# Patient Record
Sex: FEMALE | Employment: FULL TIME | URBAN - METROPOLITAN AREA
[De-identification: names, ages, dates, MRNs, and addresses within clinical notes are randomized per-mention and may not be internally consistent; named-entity substitution may affect disease eponyms.]

---

## 2023-03-01 PROBLEM — F41.9 ANXIETY: Status: ACTIVE | Noted: 2023-03-01

## 2023-03-01 PROBLEM — G43.909 MIGRAINES: Status: ACTIVE | Noted: 2023-03-01

## 2023-03-01 PROBLEM — I10 BENIGN ESSENTIAL HYPERTENSION: Status: ACTIVE | Noted: 2023-03-01

## 2023-03-01 RX ORDER — ESCITALOPRAM OXALATE 20 MG/1
20 TABLET ORAL DAILY
COMMUNITY
End: 2023-03-13 | Stop reason: SDUPTHER

## 2023-03-01 RX ORDER — NAPROXEN 500 MG/1
500 TABLET ORAL EVERY 12 HOURS PRN
COMMUNITY
Start: 2022-07-14 | End: 2023-10-03

## 2023-03-01 RX ORDER — CALCIUM CARBONATE/VITAMIN D3 500-10/5ML
LIQUID (ML) ORAL
COMMUNITY

## 2023-03-01 RX ORDER — ESCITALOPRAM OXALATE 10 MG/1
10 TABLET ORAL DAILY
COMMUNITY
End: 2023-10-03 | Stop reason: SDUPTHER

## 2023-03-01 RX ORDER — RIZATRIPTAN BENZOATE 10 MG/1
10 TABLET ORAL
COMMUNITY
Start: 2022-10-07 | End: 2023-10-03 | Stop reason: SDUPTHER

## 2023-03-01 RX ORDER — NORETHINDRONE AND ETHINYL ESTRADIOL AND FERROUS FUMARATE 0.8-25(24)
1 KIT ORAL DAILY
COMMUNITY
End: 2023-10-03 | Stop reason: SDUPTHER

## 2023-03-01 RX ORDER — LISINOPRIL 10 MG/1
10 TABLET ORAL DAILY
COMMUNITY
End: 2023-03-13 | Stop reason: SDUPTHER

## 2023-03-01 RX ORDER — DROSPIRENONE AND ETHINYL ESTRADIOL 0.03MG-3MG
KIT ORAL
COMMUNITY
End: 2023-10-03

## 2023-03-01 RX ORDER — MULTIVITAMIN
TABLET ORAL
COMMUNITY

## 2023-03-01 RX ORDER — DIPHENHYDRAMINE HCL 25 MG
TABLET ORAL
COMMUNITY
End: 2023-10-03 | Stop reason: ALTCHOICE

## 2023-03-01 RX ORDER — HYDROXYZINE HYDROCHLORIDE 10 MG/1
10 TABLET, FILM COATED ORAL 3 TIMES DAILY PRN
COMMUNITY
Start: 2022-07-14 | End: 2023-10-03

## 2023-03-13 ENCOUNTER — OFFICE VISIT (OUTPATIENT)
Dept: PRIMARY CARE | Facility: CLINIC | Age: 33
End: 2023-03-13
Payer: COMMERCIAL

## 2023-03-13 VITALS
TEMPERATURE: 97.3 F | HEIGHT: 63 IN | BODY MASS INDEX: 20.73 KG/M2 | HEART RATE: 87 BPM | WEIGHT: 117 LBS | OXYGEN SATURATION: 98 % | DIASTOLIC BLOOD PRESSURE: 79 MMHG | SYSTOLIC BLOOD PRESSURE: 114 MMHG

## 2023-03-13 DIAGNOSIS — G43.709 CHRONIC MIGRAINE WITHOUT AURA WITHOUT STATUS MIGRAINOSUS, NOT INTRACTABLE: ICD-10-CM

## 2023-03-13 DIAGNOSIS — J01.00 ACUTE NON-RECURRENT MAXILLARY SINUSITIS: ICD-10-CM

## 2023-03-13 DIAGNOSIS — F41.9 ANXIETY: Primary | ICD-10-CM

## 2023-03-13 DIAGNOSIS — I10 BENIGN ESSENTIAL HYPERTENSION: ICD-10-CM

## 2023-03-13 PROCEDURE — 99214 OFFICE O/P EST MOD 30 MIN: CPT | Performed by: STUDENT IN AN ORGANIZED HEALTH CARE EDUCATION/TRAINING PROGRAM

## 2023-03-13 PROCEDURE — 3074F SYST BP LT 130 MM HG: CPT | Performed by: STUDENT IN AN ORGANIZED HEALTH CARE EDUCATION/TRAINING PROGRAM

## 2023-03-13 PROCEDURE — 3078F DIAST BP <80 MM HG: CPT | Performed by: STUDENT IN AN ORGANIZED HEALTH CARE EDUCATION/TRAINING PROGRAM

## 2023-03-13 PROCEDURE — 1036F TOBACCO NON-USER: CPT | Performed by: STUDENT IN AN ORGANIZED HEALTH CARE EDUCATION/TRAINING PROGRAM

## 2023-03-13 RX ORDER — AMOXICILLIN AND CLAVULANATE POTASSIUM 875; 125 MG/1; MG/1
875 TABLET, FILM COATED ORAL 2 TIMES DAILY
Qty: 20 TABLET | Refills: 0 | Status: SHIPPED | OUTPATIENT
Start: 2023-03-13 | End: 2023-03-23

## 2023-03-13 RX ORDER — ESCITALOPRAM OXALATE 20 MG/1
20 TABLET ORAL DAILY
Qty: 30 TABLET | Refills: 3 | Status: SHIPPED | OUTPATIENT
Start: 2023-03-13 | End: 2023-07-25 | Stop reason: SDUPTHER

## 2023-03-13 RX ORDER — LISINOPRIL 10 MG/1
10 TABLET ORAL DAILY
Qty: 30 TABLET | Refills: 3 | Status: SHIPPED | OUTPATIENT
Start: 2023-03-13 | End: 2023-07-25 | Stop reason: SDUPTHER

## 2023-03-13 SDOH — ECONOMIC STABILITY: FOOD INSECURITY: WITHIN THE PAST 12 MONTHS, YOU WORRIED THAT YOUR FOOD WOULD RUN OUT BEFORE YOU GOT MONEY TO BUY MORE.: NEVER TRUE

## 2023-03-13 SDOH — ECONOMIC STABILITY: FOOD INSECURITY: WITHIN THE PAST 12 MONTHS, THE FOOD YOU BOUGHT JUST DIDN'T LAST AND YOU DIDN'T HAVE MONEY TO GET MORE.: NEVER TRUE

## 2023-03-13 ASSESSMENT — ENCOUNTER SYMPTOMS
DEPRESSION: 0
LOSS OF SENSATION IN FEET: 0
OCCASIONAL FEELINGS OF UNSTEADINESS: 0

## 2023-03-13 ASSESSMENT — LIFESTYLE VARIABLES
SKIP TO QUESTIONS 9-10: 0
AUDIT-C TOTAL SCORE: 2
HOW OFTEN DO YOU HAVE A DRINK CONTAINING ALCOHOL: MONTHLY OR LESS
HOW OFTEN DO YOU HAVE SIX OR MORE DRINKS ON ONE OCCASION: LESS THAN MONTHLY
HOW MANY STANDARD DRINKS CONTAINING ALCOHOL DO YOU HAVE ON A TYPICAL DAY: 1 OR 2

## 2023-03-13 ASSESSMENT — PATIENT HEALTH QUESTIONNAIRE - PHQ9
SUM OF ALL RESPONSES TO PHQ9 QUESTIONS 1 & 2: 0
1. LITTLE INTEREST OR PLEASURE IN DOING THINGS: NOT AT ALL
2. FEELING DOWN, DEPRESSED OR HOPELESS: NOT AT ALL

## 2023-03-13 NOTE — PROGRESS NOTES
"Subjective   Patient ID: Rika Vasquez is a 33 y.o. female who presents for Follow-up.    Pt presenting to follow up.  Her mood has been better and she is tolerating well the lexapro.  Report some sinusitis with nasal drainage. 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         Review of Systems   All other systems reviewed and are negative.      Objective   /79 (BP Location: Left arm, Patient Position: Sitting, BP Cuff Size: Large adult)   Pulse 87   Temp 36.3 °C (97.3 °F)   Ht 1.6 m (5' 3\")   Wt 53.1 kg (117 lb)   SpO2 98%   BMI 20.73 kg/m²     Physical Exam  Vitals and nursing note reviewed.   Constitutional:       General: She is not in acute distress.     Appearance: She is not ill-appearing.   HENT:      Head: Normocephalic and atraumatic.   Cardiovascular:      Rate and Rhythm: Normal rate and regular rhythm.      Pulses: Normal pulses.      Heart sounds: Normal heart sounds. No murmur heard.     No friction rub. No gallop.   Pulmonary:      Effort: Pulmonary effort is normal. No respiratory distress.      Breath sounds: Normal breath sounds. No stridor. No wheezing, rhonchi or rales.   Neurological:      Mental Status: She is alert and oriented to person, place, and time.   Psychiatric:         Mood and Affect: Mood normal.         Assessment/Plan   Problem List Items Addressed This Visit          Circulatory    Benign essential hypertension     Chronic and stable  Continue with lisinopril         Relevant Medications    lisinopril 10 mg tablet    Other Relevant Orders    Follow Up In Primary Care       Other    Anxiety - Primary     Chronic and stable  Continue with lexapro         Relevant Medications    escitalopram (Lexapro) 20 mg tablet    Other Relevant Orders    Follow Up In Primary Care    Migraines     Chronic and stable  Continue with rizatriptan         Relevant Orders    Follow Up In Primary Care     Other Visit Diagnoses       Acute non-recurrent maxillary sinusitis        " Order augmentin  Recommend flonase OTC    Relevant Medications    amoxicillin-pot clavulanate (Augmentin) 875-125 mg tablet    Other Relevant Orders    Follow Up In Primary Care

## 2023-07-24 ENCOUNTER — TELEPHONE (OUTPATIENT)
Dept: PRIMARY CARE | Facility: CLINIC | Age: 33
End: 2023-07-24
Payer: COMMERCIAL

## 2023-07-24 DIAGNOSIS — I10 BENIGN ESSENTIAL HYPERTENSION: ICD-10-CM

## 2023-07-24 DIAGNOSIS — F41.9 ANXIETY: ICD-10-CM

## 2023-07-24 NOTE — TELEPHONE ENCOUNTER
Former [facility] pt    Pt called in and is requesting a refill on Lisinopril 10 mg  and Lexapro 20 mg.     Pt's pharmacy is [facility] [address] [place]

## 2023-07-25 RX ORDER — ESCITALOPRAM OXALATE 20 MG/1
20 TABLET ORAL DAILY
Qty: 90 TABLET | Refills: 0 | Status: SHIPPED | OUTPATIENT
Start: 2023-07-25 | End: 2023-10-03 | Stop reason: SDUPTHER

## 2023-07-25 RX ORDER — ELETRIPTAN HYDROBROMIDE 20 MG/1
20 TABLET, FILM COATED ORAL ONCE AS NEEDED
COMMUNITY
Start: 2023-01-18 | End: 2023-10-03

## 2023-07-25 RX ORDER — LISINOPRIL 10 MG/1
10 TABLET ORAL DAILY
Qty: 90 TABLET | Refills: 0 | Status: SHIPPED | OUTPATIENT
Start: 2023-07-25 | End: 2023-10-03 | Stop reason: SDUPTHER

## 2023-07-25 NOTE — TELEPHONE ENCOUNTER
PATIENT CALLED IN FOR REFILL ON LISINOPRIL, LEXAPRO 20MG    PHARMACY: [facility], [place]    FIRST TIME SEEING MELI IS OCTOBER 3, 2023

## 2023-10-03 ENCOUNTER — OFFICE VISIT (OUTPATIENT)
Dept: PRIMARY CARE | Facility: CLINIC | Age: 33
End: 2023-10-03
Payer: COMMERCIAL

## 2023-10-03 VITALS
RESPIRATION RATE: 16 BRPM | WEIGHT: 128 LBS | BODY MASS INDEX: 22.67 KG/M2 | SYSTOLIC BLOOD PRESSURE: 120 MMHG | TEMPERATURE: 96.8 F | OXYGEN SATURATION: 99 % | DIASTOLIC BLOOD PRESSURE: 83 MMHG | HEART RATE: 78 BPM

## 2023-10-03 DIAGNOSIS — F41.9 ANXIETY: ICD-10-CM

## 2023-10-03 DIAGNOSIS — Z13.29 SCREENING FOR THYROID DISORDER: ICD-10-CM

## 2023-10-03 DIAGNOSIS — G44.209 MUSCLE TENSION HEADACHE: ICD-10-CM

## 2023-10-03 DIAGNOSIS — G43.709 CHRONIC MIGRAINE WITHOUT AURA WITHOUT STATUS MIGRAINOSUS, NOT INTRACTABLE: ICD-10-CM

## 2023-10-03 DIAGNOSIS — F51.01 PRIMARY INSOMNIA: ICD-10-CM

## 2023-10-03 DIAGNOSIS — Z30.09 FAMILY PLANNING COUNSELING: ICD-10-CM

## 2023-10-03 DIAGNOSIS — I10 BENIGN ESSENTIAL HYPERTENSION: Primary | ICD-10-CM

## 2023-10-03 PROCEDURE — 3074F SYST BP LT 130 MM HG: CPT

## 2023-10-03 PROCEDURE — 3079F DIAST BP 80-89 MM HG: CPT

## 2023-10-03 PROCEDURE — 1036F TOBACCO NON-USER: CPT

## 2023-10-03 PROCEDURE — 99214 OFFICE O/P EST MOD 30 MIN: CPT

## 2023-10-03 RX ORDER — LISINOPRIL 10 MG/1
10 TABLET ORAL DAILY
Qty: 90 TABLET | Refills: 3 | Status: SHIPPED | OUTPATIENT
Start: 2023-10-03 | End: 2024-10-02

## 2023-10-03 RX ORDER — TRAZODONE HYDROCHLORIDE 50 MG/1
50 TABLET ORAL NIGHTLY PRN
Qty: 30 TABLET | Refills: 5 | Status: SHIPPED | OUTPATIENT
Start: 2023-10-03 | End: 2024-03-27

## 2023-10-03 RX ORDER — ESCITALOPRAM OXALATE 20 MG/1
20 TABLET ORAL DAILY
Qty: 90 TABLET | Refills: 3 | Status: SHIPPED | OUTPATIENT
Start: 2023-10-03 | End: 2024-10-02

## 2023-10-03 RX ORDER — NORETHINDRONE AND ETHINYL ESTRADIOL AND FERROUS FUMARATE 0.8-25(24)
1 KIT ORAL DAILY
Qty: 28 TABLET | Refills: 3 | Status: SHIPPED | OUTPATIENT
Start: 2023-10-03 | End: 2024-01-15 | Stop reason: SDUPTHER

## 2023-10-03 RX ORDER — RIZATRIPTAN BENZOATE 10 MG/1
10 TABLET ORAL ONCE AS NEEDED
Qty: 9 TABLET | Refills: 3 | Status: SHIPPED | OUTPATIENT
Start: 2023-10-03 | End: 2024-01-15 | Stop reason: SDUPTHER

## 2023-10-03 RX ORDER — ESCITALOPRAM OXALATE 10 MG/1
10 TABLET ORAL DAILY
Qty: 90 TABLET | Refills: 3 | Status: SHIPPED | OUTPATIENT
Start: 2023-10-03 | End: 2024-01-15 | Stop reason: ALTCHOICE

## 2023-10-03 RX ORDER — TIZANIDINE 2 MG/1
2 TABLET ORAL EVERY 8 HOURS PRN
Qty: 30 TABLET | Refills: 3 | Status: SHIPPED | OUTPATIENT
Start: 2023-10-03 | End: 2023-11-30

## 2023-10-03 SDOH — ECONOMIC STABILITY: FOOD INSECURITY: WITHIN THE PAST 12 MONTHS, THE FOOD YOU BOUGHT JUST DIDN'T LAST AND YOU DIDN'T HAVE MONEY TO GET MORE.: NEVER TRUE

## 2023-10-03 SDOH — ECONOMIC STABILITY: FOOD INSECURITY: WITHIN THE PAST 12 MONTHS, YOU WORRIED THAT YOUR FOOD WOULD RUN OUT BEFORE YOU GOT MONEY TO BUY MORE.: NEVER TRUE

## 2023-10-03 ASSESSMENT — ENCOUNTER SYMPTOMS
MUSCULOSKELETAL NEGATIVE: 1
CONSTITUTIONAL NEGATIVE: 1
CARDIOVASCULAR NEGATIVE: 1
RESPIRATORY NEGATIVE: 1
GASTROINTESTINAL NEGATIVE: 1
PSYCHIATRIC NEGATIVE: 1
NEUROLOGICAL NEGATIVE: 1
ENDOCRINE NEGATIVE: 1
EYES NEGATIVE: 1
HEMATOLOGIC/LYMPHATIC NEGATIVE: 1

## 2023-10-03 ASSESSMENT — LIFESTYLE VARIABLES
HOW OFTEN DO YOU HAVE SIX OR MORE DRINKS ON ONE OCCASION: NEVER
SKIP TO QUESTIONS 9-10: 1
HOW OFTEN DO YOU HAVE A DRINK CONTAINING ALCOHOL: MONTHLY OR LESS
AUDIT-C TOTAL SCORE: 1
HOW MANY STANDARD DRINKS CONTAINING ALCOHOL DO YOU HAVE ON A TYPICAL DAY: 1 OR 2

## 2023-10-03 ASSESSMENT — PAIN SCALES - GENERAL: PAINLEVEL: 0-NO PAIN

## 2023-10-03 NOTE — PROGRESS NOTES
Subjective   Patient ID: Rika Vasquez is a 33 y.o. female who presents for hypertension and anxiety follow up.    Diet: Cooking at home, eating healthy. Good amounts of lean proteins, fruits, veggies. Avoiding sugary beverages; sweets periodically  Exercise: Yoga daily for about 20-60 minutes and walking 30-60 minutes, exercise most days of the week  Weight: Stable  Water: Drinking about  oz per day  Sleep: Having issues stable through the night, feeling groggy in the morning. Getting about 3-4 hours at a time, up in the middle of the night for about an hour. Occurring most night.   Social: Single, lives alone in a single floor home with raised level, no issues with stairs. No children, 2 cats. Mother identified as support source  Professional: Full time     Review of Systems   Constitutional: Negative.    HENT: Negative.     Eyes: Negative.    Respiratory: Negative.     Cardiovascular: Negative.    Gastrointestinal: Negative.    Endocrine: Negative.    Genitourinary: Negative.    Musculoskeletal: Negative.    Skin: Negative.    Neurological: Negative.    Hematological: Negative.    Psychiatric/Behavioral: Negative.          Current Outpatient Medications   Medication Sig Dispense Refill    ACETAMINOPHEN-CAFFEINE ORAL -      magnesium oxide 400 mg magnesium capsule -      multivitamin tablet -      escitalopram (Lexapro) 10 mg tablet Take 1 tablet (10 mg) by mouth once daily. 90 tablet 3    escitalopram (Lexapro) 20 mg tablet Take 1 tablet (20 mg) by mouth once daily. 90 tablet 3    lisinopril 10 mg tablet Take 1 tablet (10 mg) by mouth once daily. 90 tablet 3    noreth-ethinyl estradioL-iron 0.8mg-25mcg(24) and 75 mg (4) tablet,chewable Chew 1 tablet once daily. 28 tablet 3    rizatriptan (Maxalt) 10 mg tablet Take 1 tablet (10 mg) by mouth 1 time if needed for migraine. at onset of headache.  May repeat every 2 hours as needed.  Maximum 3 tablets in 24 hours. 9 tablet 3    tiZANidine  (Zanaflex) 2 mg tablet Take 1 tablet (2 mg) by mouth every 8 hours if needed for muscle spasms. 30 tablet 3    traZODone (Desyrel) 50 mg tablet Take 1 tablet (50 mg) by mouth as needed at bedtime for sleep. 30 tablet 5     No current facility-administered medications for this visit.     Past Surgical History:   Procedure Laterality Date    OTHER SURGICAL HISTORY  07/14/2022    Oral surgery    WISDOM TOOTH EXTRACTION  2013     Family History   Problem Relation Name Age of Onset    Hypertension Mother Jessenia kessler     Migraines Mother Jessenia kessler     Diabetes Mother Jessenia kessler     Other (Cardiac disorder) Father Ed victoria     Hypertension Father Ed victoria     Migraines Father Ed victoria     Heart attack Father Ed victoria     Arthritis Father Ed victoria     Blood clot Father Ed victoria     Hearing loss Father Ed victoria     Heart disease Father Ed victoria     Other (Cardiac disorder) Father's Brother Richard kessler     Hypertension Father's Brother Richard kessler     Migraines Father's Brother Richard kessler     Heart attack Father's Brother Richard kessler     Other (Cardiac disorder) Other      Hypertension Other      Migraines Other      Heart attack Other        Social History     Tobacco Use    Smoking status: Never    Smokeless tobacco: Never   Vaping Use    Vaping Use: Never used   Substance Use Topics    Alcohol use: Yes     Alcohol/week: 1.0 standard drink of alcohol     Types: 1 Cans of beer per week     Comment: This is only monthy if that.    Drug use: Never        Objective     Visit Vitals  /83 (BP Location: Left arm, Patient Position: Sitting, BP Cuff Size: Adult)   Pulse 78   Temp 36 °C (96.8 °F) (Temporal)   Resp 16   Wt 58.1 kg (128 lb)   SpO2 99%   BMI 22.67 kg/m²   Smoking Status Never   BSA 1.61 m²        Physical Exam  Constitutional:       Appearance: Normal appearance.   HENT:      Head: Normocephalic and atraumatic.   Eyes:      Extraocular Movements: Extraocular movements intact.      Pupils: Pupils are equal, round, and  reactive to light.   Cardiovascular:      Rate and Rhythm: Normal rate and regular rhythm.   Pulmonary:      Effort: Pulmonary effort is normal.      Breath sounds: Normal breath sounds.   Abdominal:      General: Abdomen is flat. Bowel sounds are normal.      Palpations: Abdomen is soft.   Musculoskeletal:         General: Normal range of motion.   Neurological:      General: No focal deficit present.      Mental Status: She is alert and oriented to person, place, and time.   Psychiatric:         Mood and Affect: Mood normal.         Behavior: Behavior normal.           Assessment/Plan   Problem List Items Addressed This Visit       Anxiety     Increases escitalopram to 30 mg during school year due to higher stress and anxiety. Feels this dose adequately controls her anxiety.    Escitalopram increased to 30 mg daily          Relevant Medications    escitalopram (Lexapro) 10 mg tablet    escitalopram (Lexapro) 20 mg tablet    Other Relevant Orders    CBC    Comprehensive Metabolic Panel    Follow Up In Primary Care - Established    Migraines     Nurtec worked best, but not covered by insurance  Headaches mainly tension    Start tizanidine 2-4mg as needed at night  Can consider starting topirimate for prevention  Recommend massage therapy         Relevant Medications    rizatriptan (Maxalt) 10 mg tablet    Other Relevant Orders    Follow Up In Primary Care - Established    Benign essential hypertension - Primary     Controlled in office today at 120/83  Continue checking blood pressures at home    Continue lisinopril 10mg daily         Relevant Medications    lisinopril 10 mg tablet    Other Relevant Orders    Lipid Panel    Follow Up In Primary Care - Established     Other Visit Diagnoses       Screening for thyroid disorder        Relevant Orders    Vitamin D 25-Hydroxy,Total (for eval of Vitamin D levels)    TSH with reflex to Free T4 if abnormal    Follow Up In Primary Care - Established    Family planning  counseling        Relevant Medications    noreth-ethinyl estradioL-iron 0.8mg-25mcg(24) and 75 mg (4) tablet,chewable    Other Relevant Orders    Follow Up In Primary Care - Established    Primary insomnia        Relevant Medications    traZODone (Desyrel) 50 mg tablet    Other Relevant Orders    Follow Up In Primary Care - Established    Muscle tension headache        Relevant Medications    tiZANidine (Zanaflex) 2 mg tablet    Other Relevant Orders    Referral to Massage Therapy    Follow Up In Primary Care - Established            All pertinent lab work and results were reviewed with patient.     Follow up with me in 3-4 months or sooner as needed    Homa Murrell, APRN-CNP

## 2023-10-03 NOTE — ASSESSMENT & PLAN NOTE
Nurtec worked best, but not covered by insurance  Headaches mainly tension    Start tizanidine 2-4mg as needed at night  Can consider starting topirimate for prevention  Recommend massage therapy

## 2023-10-03 NOTE — PATIENT INSTRUCTIONS
Thank you for coming to see me today.  If you have any questions or concerns following our visit, please contact the office.  Phone: [phone]    Follow up with me in 3-4 months for headache follow up    1)  Get fasting labwork a few days prior to next visit.  The lab is down the [place] from our office.     2) START tizanidine 2-4mg as needed for muscle tension/tightness    3) START trazodone 50-100mg nightly for sleep- do not start until you know how tizanidine affects you    4) I am referring you to massage therapy- can check with chiropractor to get name of recommended provider or call insurance company

## 2023-10-09 NOTE — ASSESSMENT & PLAN NOTE
Controlled in office today at 120/83  Continue checking blood pressures at home    Continue lisinopril 10mg daily

## 2023-11-29 DIAGNOSIS — G44.209 MUSCLE TENSION HEADACHE: ICD-10-CM

## 2023-11-30 RX ORDER — TIZANIDINE 2 MG/1
TABLET ORAL
Qty: 30 TABLET | Refills: 3 | Status: SHIPPED | OUTPATIENT
Start: 2023-11-30 | End: 2024-01-15

## 2024-01-11 ENCOUNTER — LAB (OUTPATIENT)
Dept: LAB | Facility: LAB | Age: 34
End: 2024-01-11
Payer: COMMERCIAL

## 2024-01-11 DIAGNOSIS — I10 BENIGN ESSENTIAL HYPERTENSION: ICD-10-CM

## 2024-01-11 DIAGNOSIS — F41.9 ANXIETY: ICD-10-CM

## 2024-01-11 DIAGNOSIS — Z13.29 SCREENING FOR THYROID DISORDER: ICD-10-CM

## 2024-01-11 LAB
25(OH)D3 SERPL-MCNC: 36 NG/ML (ref 30–100)
ALBUMIN SERPL BCP-MCNC: 4.7 G/DL (ref 3.4–5)
ALP SERPL-CCNC: 53 U/L (ref 33–110)
ALT SERPL W P-5'-P-CCNC: 14 U/L (ref 7–45)
ANION GAP SERPL CALC-SCNC: 13 MMOL/L (ref 10–20)
AST SERPL W P-5'-P-CCNC: 18 U/L (ref 9–39)
BILIRUB SERPL-MCNC: 0.4 MG/DL (ref 0–1.2)
BUN SERPL-MCNC: 10 MG/DL (ref 6–23)
CALCIUM SERPL-MCNC: 9.6 MG/DL (ref 8.6–10.3)
CHLORIDE SERPL-SCNC: 104 MMOL/L (ref 98–107)
CHOLEST SERPL-MCNC: 201 MG/DL (ref 0–199)
CHOLESTEROL/HDL RATIO: 3.7
CO2 SERPL-SCNC: 23 MMOL/L (ref 21–32)
CREAT SERPL-MCNC: 0.79 MG/DL (ref 0.5–1.05)
EGFRCR SERPLBLD CKD-EPI 2021: >90 ML/MIN/1.73M*2
ERYTHROCYTE [DISTWIDTH] IN BLOOD BY AUTOMATED COUNT: 11.9 % (ref 11.5–14.5)
GLUCOSE SERPL-MCNC: 92 MG/DL (ref 74–99)
HCT VFR BLD AUTO: 43.9 % (ref 36–46)
HDLC SERPL-MCNC: 54.8 MG/DL
HGB BLD-MCNC: 14.8 G/DL (ref 12–16)
LDLC SERPL CALC-MCNC: 129 MG/DL
MCH RBC QN AUTO: 30.7 PG (ref 26–34)
MCHC RBC AUTO-ENTMCNC: 33.7 G/DL (ref 32–36)
MCV RBC AUTO: 91 FL (ref 80–100)
NON HDL CHOLESTEROL: 146 MG/DL (ref 0–149)
NRBC BLD-RTO: 0 /100 WBCS (ref 0–0)
PLATELET # BLD AUTO: 311 X10*3/UL (ref 150–450)
POTASSIUM SERPL-SCNC: 3.9 MMOL/L (ref 3.5–5.3)
PROT SERPL-MCNC: 7.4 G/DL (ref 6.4–8.2)
RBC # BLD AUTO: 4.82 X10*6/UL (ref 4–5.2)
SODIUM SERPL-SCNC: 136 MMOL/L (ref 136–145)
TRIGL SERPL-MCNC: 88 MG/DL (ref 0–149)
TSH SERPL-ACNC: 2.96 MIU/L (ref 0.44–3.98)
VLDL: 18 MG/DL (ref 0–40)
WBC # BLD AUTO: 5.7 X10*3/UL (ref 4.4–11.3)

## 2024-01-11 PROCEDURE — 80061 LIPID PANEL: CPT

## 2024-01-11 PROCEDURE — 36415 COLL VENOUS BLD VENIPUNCTURE: CPT

## 2024-01-11 PROCEDURE — 85027 COMPLETE CBC AUTOMATED: CPT

## 2024-01-11 PROCEDURE — 84443 ASSAY THYROID STIM HORMONE: CPT

## 2024-01-11 PROCEDURE — 82306 VITAMIN D 25 HYDROXY: CPT

## 2024-01-11 PROCEDURE — 80053 COMPREHEN METABOLIC PANEL: CPT

## 2024-01-15 ENCOUNTER — OFFICE VISIT (OUTPATIENT)
Dept: PRIMARY CARE | Facility: CLINIC | Age: 34
End: 2024-01-15
Payer: COMMERCIAL

## 2024-01-15 VITALS
BODY MASS INDEX: 22.06 KG/M2 | RESPIRATION RATE: 20 BRPM | HEART RATE: 87 BPM | OXYGEN SATURATION: 98 % | WEIGHT: 124.5 LBS | TEMPERATURE: 98.3 F | HEIGHT: 63 IN | DIASTOLIC BLOOD PRESSURE: 85 MMHG | SYSTOLIC BLOOD PRESSURE: 124 MMHG

## 2024-01-15 DIAGNOSIS — F41.9 ANXIETY: ICD-10-CM

## 2024-01-15 DIAGNOSIS — Z30.09 FAMILY PLANNING COUNSELING: ICD-10-CM

## 2024-01-15 DIAGNOSIS — F51.01 PRIMARY INSOMNIA: ICD-10-CM

## 2024-01-15 DIAGNOSIS — Z13.29 SCREENING FOR THYROID DISORDER: ICD-10-CM

## 2024-01-15 DIAGNOSIS — G44.209 MUSCLE TENSION HEADACHE: ICD-10-CM

## 2024-01-15 DIAGNOSIS — G43.709 CHRONIC MIGRAINE WITHOUT AURA WITHOUT STATUS MIGRAINOSUS, NOT INTRACTABLE: ICD-10-CM

## 2024-01-15 DIAGNOSIS — I10 BENIGN ESSENTIAL HYPERTENSION: ICD-10-CM

## 2024-01-15 PROCEDURE — 1036F TOBACCO NON-USER: CPT

## 2024-01-15 PROCEDURE — 3074F SYST BP LT 130 MM HG: CPT

## 2024-01-15 PROCEDURE — 3079F DIAST BP 80-89 MM HG: CPT

## 2024-01-15 PROCEDURE — 99213 OFFICE O/P EST LOW 20 MIN: CPT

## 2024-01-15 RX ORDER — BACLOFEN 10 MG/1
10 TABLET ORAL 2 TIMES DAILY
Qty: 60 TABLET | Refills: 5 | Status: SHIPPED | OUTPATIENT
Start: 2024-01-15 | End: 2024-07-13

## 2024-01-15 RX ORDER — NORETHINDRONE AND ETHINYL ESTRADIOL AND FERROUS FUMARATE 0.8-25(24)
1 KIT ORAL DAILY
Qty: 28 TABLET | Refills: 1 | Status: SHIPPED | OUTPATIENT
Start: 2024-01-15 | End: 2024-02-06 | Stop reason: SDUPTHER

## 2024-01-15 RX ORDER — RIZATRIPTAN BENZOATE 10 MG/1
10 TABLET ORAL ONCE AS NEEDED
Qty: 9 TABLET | Refills: 3 | Status: SHIPPED | OUTPATIENT
Start: 2024-01-15 | End: 2024-05-02

## 2024-01-15 SDOH — ECONOMIC STABILITY: FOOD INSECURITY: WITHIN THE PAST 12 MONTHS, THE FOOD YOU BOUGHT JUST DIDN'T LAST AND YOU DIDN'T HAVE MONEY TO GET MORE.: NEVER TRUE

## 2024-01-15 SDOH — ECONOMIC STABILITY: FOOD INSECURITY: WITHIN THE PAST 12 MONTHS, YOU WORRIED THAT YOUR FOOD WOULD RUN OUT BEFORE YOU GOT MONEY TO BUY MORE.: NEVER TRUE

## 2024-01-15 ASSESSMENT — ENCOUNTER SYMPTOMS
NEUROLOGICAL NEGATIVE: 1
PSYCHIATRIC NEGATIVE: 1
OCCASIONAL FEELINGS OF UNSTEADINESS: 0
RESPIRATORY NEGATIVE: 1
ENDOCRINE NEGATIVE: 1
CARDIOVASCULAR NEGATIVE: 1
CONSTITUTIONAL NEGATIVE: 1
DEPRESSION: 0
EYES NEGATIVE: 1
MUSCULOSKELETAL NEGATIVE: 1
HEMATOLOGIC/LYMPHATIC NEGATIVE: 1
LOSS OF SENSATION IN FEET: 0
GASTROINTESTINAL NEGATIVE: 1

## 2024-01-15 ASSESSMENT — LIFESTYLE VARIABLES
HOW OFTEN DO YOU HAVE SIX OR MORE DRINKS ON ONE OCCASION: LESS THAN MONTHLY
HOW OFTEN DO YOU HAVE A DRINK CONTAINING ALCOHOL: MONTHLY OR LESS
AUDIT-C TOTAL SCORE: 2
SKIP TO QUESTIONS 9-10: 0
HOW MANY STANDARD DRINKS CONTAINING ALCOHOL DO YOU HAVE ON A TYPICAL DAY: 1 OR 2

## 2024-01-15 ASSESSMENT — PAIN SCALES - GENERAL: PAINLEVEL: 0-NO PAIN

## 2024-01-15 NOTE — ASSESSMENT & PLAN NOTE
Decreased frequency in migraines, 2-3 per month mostly around cycle    Continue rizatriptan 10mg daily  Tizanidine interacting with birth control; will switch to baclofen 10mg twice daily as needed  Recommend following with massage therapy for muscle tension

## 2024-01-15 NOTE — PATIENT INSTRUCTIONS
Thank you for coming to see me today.  If you have any questions or concerns following our visit, please contact the office.  Phone: [phone]    Follow up with me in October 2024 or sooner as needed    1)  STOP tizanidine. START baclofen 10mg up to three times daily as needed for muscle tension. Consider massage therapy to help with muscle tension    2) START over the counter GasX or simethicone for bloating as needed    3) Continue checking blood pressures a few times per month

## 2024-01-15 NOTE — ASSESSMENT & PLAN NOTE
Normotensive in office at  124/85  Home blood pressures running 120s/80s    Continue lisinopril 10mg daily

## 2024-01-15 NOTE — PROGRESS NOTES
Subjective   Patient ID: Rika Vasquez is a 33 y.o. female who presents for follow up of migraines and anxiety.    Had migraine this morning, had pulsating in back of her head today. Thinks it could be due to weather. Has been having a lot of neck pain associated with migraine. Rizatriptan somewhat helped.  Following with chiropractic medicine which was somewhat helpful but feels like it's not helping as much anymore.   Has been having migraines about 2-3 times per month, typically around menstrual cycle. Improved in frequency, was previously     Diet: Cooking at home, eating healthy. Good amounts of lean proteins, fruits, veggies. Avoiding sugary beverages; sweets periodically  Exercise: Yoga daily for about 20-60 minutes and walking 30-60 minutes, exercise most days of the week  Weight: Stable  Water: Drinking about  oz per day  Sleep: Having issues stable through the night, feeling groggy in the morning. Getting about 3-4 hours at a time, up in the middle of the night for about an hour. Occurring most night.   Social: Single, lives alone in a single floor home with raised level, no issues with stairs. No children, 2 cats. Mother identified as support source  Professional: Full time     Review of Systems   Constitutional: Negative.    HENT: Negative.     Eyes: Negative.    Respiratory: Negative.     Cardiovascular: Negative.    Gastrointestinal: Negative.    Endocrine: Negative.    Genitourinary: Negative.    Musculoskeletal: Negative.    Skin: Negative.    Neurological: Negative.    Hematological: Negative.    Psychiatric/Behavioral: Negative.          Current Outpatient Medications   Medication Sig Dispense Refill    ACETAMINOPHEN-CAFFEINE ORAL -      escitalopram (Lexapro) 20 mg tablet Take 1 tablet (20 mg) by mouth once daily. 90 tablet 3    lisinopril 10 mg tablet Take 1 tablet (10 mg) by mouth once daily. 90 tablet 3    magnesium oxide 400 mg magnesium capsule -      multivitamin tablet  -      traZODone (Desyrel) 50 mg tablet Take 1 tablet (50 mg) by mouth as needed at bedtime for sleep. 30 tablet 5    baclofen (Lioresal) 10 mg tablet Take 1 tablet (10 mg) by mouth 2 times a day. 60 tablet 5    noreth-ethinyl estradioL-iron 0.8mg-25mcg(24) and 75 mg (4) tablet,chewable Chew 1 tablet once daily. 28 tablet 1    rizatriptan (Maxalt) 10 mg tablet Take 1 tablet (10 mg) by mouth 1 time if needed for migraine. at onset of headache.  May repeat every 2 hours as needed.  Maximum 3 tablets in 24 hours. 9 tablet 3     No current facility-administered medications for this visit.     Past Surgical History:   Procedure Laterality Date    OTHER SURGICAL HISTORY  07/14/2022    Oral surgery    WISDOM TOOTH EXTRACTION  2013     Family History   Problem Relation Name Age of Onset    Hypertension Mother Jessenia kessler     Migraines Mother Jessenia kessler     Diabetes Mother Jessenia kessler     Other (Cardiac disorder) Father Ed victoria     Hypertension Father Ed victoria     Migraines Father Ed victoria     Heart attack Father Ed victoria     Arthritis Father Ed victoria     Blood clot Father Ed victoria     Hearing loss Father Ed victoria     Heart disease Father Ed victoria     Other (Cardiac disorder) Father's Brother Richard kessler     Hypertension Father's Brother Richard kessler     Migraines Father's Brother Richard kessler     Heart attack Father's Brother iRchard kessler     Other (Cardiac disorder) Other      Hypertension Other      Migraines Other      Heart attack Other        Social History     Tobacco Use    Smoking status: Never     Passive exposure: Never    Smokeless tobacco: Never   Vaping Use    Vaping Use: Never used   Substance Use Topics    Alcohol use: Yes     Alcohol/week: 1.0 standard drink of alcohol     Types: 1 Cans of beer per week     Comment: This is only monthy if that.    Drug use: Never        Objective     Visit Vitals  /85 (BP Location: Left arm, Patient Position: Sitting, BP Cuff Size: Adult)   Pulse 87   Temp 36.8 °C (98.3 °F)  "  Resp 20   Ht 1.6 m (5' 3\")   Wt 56.5 kg (124 lb 8 oz)   SpO2 98%   BMI 22.05 kg/m²   Smoking Status Never   BSA 1.58 m²        Physical Exam  Constitutional:       Appearance: Normal appearance.   HENT:      Head: Normocephalic and atraumatic.   Eyes:      Extraocular Movements: Extraocular movements intact.      Pupils: Pupils are equal, round, and reactive to light.   Cardiovascular:      Rate and Rhythm: Normal rate and regular rhythm.   Pulmonary:      Effort: Pulmonary effort is normal.      Breath sounds: Normal breath sounds.   Abdominal:      General: Abdomen is flat. Bowel sounds are normal.      Palpations: Abdomen is soft.   Musculoskeletal:         General: Normal range of motion.   Skin:     General: Skin is warm and dry.      Capillary Refill: Capillary refill takes less than 2 seconds.   Neurological:      General: No focal deficit present.      Mental Status: She is alert and oriented to person, place, and time.   Psychiatric:         Mood and Affect: Mood normal.         Behavior: Behavior normal.           Assessment/Plan   Problem List Items Addressed This Visit       Anxiety     Anxiety well controlled on escitalopram 20mg daily    Continue current therapy         Migraines     Decreased frequency in migraines, 2-3 per month mostly around cycle    Continue rizatriptan 10mg daily  Tizanidine interacting with birth control; will switch to baclofen 10mg twice daily as needed  Recommend following with massage therapy for muscle tension         Relevant Medications    rizatriptan (Maxalt) 10 mg tablet    Benign essential hypertension     Normotensive in office at  124/85  Home blood pressures running 120s/80s    Continue lisinopril 10mg daily          Other Visit Diagnoses       Screening for thyroid disorder        Family planning counseling        Relevant Medications    noreth-ethinyl estradioL-iron 0.8mg-25mcg(24) and 75 mg (4) tablet,chewable    Primary insomnia        Muscle tension headache   "      Relevant Medications    baclofen (Lioresal) 10 mg tablet            All pertinent lab work and results were reviewed with patient.     Follow up with me in October 2024 or sooner as needed    Homa Murrell, CURLY-CNS

## 2024-02-06 ENCOUNTER — OFFICE VISIT (OUTPATIENT)
Dept: OBSTETRICS AND GYNECOLOGY | Facility: CLINIC | Age: 34
End: 2024-02-06
Payer: COMMERCIAL

## 2024-02-06 VITALS
HEIGHT: 63 IN | BODY MASS INDEX: 22.5 KG/M2 | WEIGHT: 127 LBS | SYSTOLIC BLOOD PRESSURE: 132 MMHG | DIASTOLIC BLOOD PRESSURE: 72 MMHG

## 2024-02-06 DIAGNOSIS — Z30.40 ENCOUNTER FOR REFILL OF PRESCRIPTION FOR CONTRACEPTION: ICD-10-CM

## 2024-02-06 DIAGNOSIS — Z01.419 ENCOUNTER FOR ANNUAL ROUTINE GYNECOLOGICAL EXAMINATION: ICD-10-CM

## 2024-02-06 DIAGNOSIS — Z30.09 FAMILY PLANNING COUNSELING: ICD-10-CM

## 2024-02-06 DIAGNOSIS — Z12.4 SCREENING FOR CERVICAL CANCER: Primary | ICD-10-CM

## 2024-02-06 PROCEDURE — 1036F TOBACCO NON-USER: CPT | Performed by: OBSTETRICS & GYNECOLOGY

## 2024-02-06 PROCEDURE — 87624 HPV HI-RISK TYP POOLED RSLT: CPT

## 2024-02-06 PROCEDURE — 3078F DIAST BP <80 MM HG: CPT | Performed by: OBSTETRICS & GYNECOLOGY

## 2024-02-06 PROCEDURE — 3075F SYST BP GE 130 - 139MM HG: CPT | Performed by: OBSTETRICS & GYNECOLOGY

## 2024-02-06 PROCEDURE — 88175 CYTOPATH C/V AUTO FLUID REDO: CPT

## 2024-02-06 PROCEDURE — 99385 PREV VISIT NEW AGE 18-39: CPT | Performed by: OBSTETRICS & GYNECOLOGY

## 2024-02-06 RX ORDER — NORETHINDRONE AND ETHINYL ESTRADIOL AND FERROUS FUMARATE 0.8-25(24)
1 KIT ORAL DAILY
Qty: 28 TABLET | Refills: 11 | Status: SHIPPED | OUTPATIENT
Start: 2024-02-06

## 2024-02-06 NOTE — PROGRESS NOTES
Subjective   Patient ID: Rika Vasquez is a 33 y.o. female who presents for New Patient Visit (Patient here to establish care).  HPI 33 years old G0 sexually active on oral contraceptive condoms presents for yearly exam.  She denies any concerns.    Review of Systems   All other systems reviewed and are negative.      Objective   Physical Exam  Vitals reviewed.   Constitutional:       Appearance: Normal appearance.   HENT:      Head: Normocephalic and atraumatic.      Nose: Nose normal.   Cardiovascular:      Rate and Rhythm: Normal rate and regular rhythm.   Pulmonary:      Effort: Pulmonary effort is normal.      Breath sounds: Normal breath sounds.   Chest:      Chest wall: No mass.   Breasts:     Right: Normal.      Left: Normal.   Abdominal:      General: Abdomen is flat. Bowel sounds are normal. There is no distension.      Palpations: Abdomen is soft. There is no mass.   Genitourinary:     General: Normal vulva.      Vagina: Normal.      Cervix: Normal.      Uterus: Normal.       Adnexa: Right adnexa normal and left adnexa normal.      Rectum: Normal.   Musculoskeletal:         General: Normal range of motion.      Cervical back: Normal range of motion.   Skin:     General: Skin is warm and dry.   Neurological:      General: No focal deficit present.      Mental Status: She is alert.   Psychiatric:         Mood and Affect: Mood normal.         Behavior: Behavior normal.         Assessment/Plan   Problem List Items Addressed This Visit             ICD-10-CM    Encounter for refill of prescription for contraception Z30.40    Screening for cervical cancer - Primary Z12.4    Relevant Orders    THINPREP PAP TEST     Other Visit Diagnoses         Codes    Family planning counseling     Z30.09    Relevant Medications    noreth-ethinyl estradioL-iron 0.8mg-25mcg(24) and 75 mg (4) tablet,chewable        Screenings done today.  She declines STD screening.  She was encouraged to self breast exam monthly, eat healthy and  exercise regularly.  Refill of her oral contraceptive was E scribed.  Follow-up as needed or in 1 to 2 years.         Satish Bolton MD 02/06/24 8:46 AM

## 2024-03-27 DIAGNOSIS — F51.01 PRIMARY INSOMNIA: ICD-10-CM

## 2024-03-27 RX ORDER — TRAZODONE HYDROCHLORIDE 50 MG/1
50 TABLET ORAL NIGHTLY PRN
Qty: 30 TABLET | Refills: 0 | Status: SHIPPED | OUTPATIENT
Start: 2024-03-27

## 2024-04-30 DIAGNOSIS — G43.709 CHRONIC MIGRAINE WITHOUT AURA WITHOUT STATUS MIGRAINOSUS, NOT INTRACTABLE: ICD-10-CM

## 2024-05-02 RX ORDER — RIZATRIPTAN BENZOATE 10 MG/1
TABLET ORAL
Qty: 9 TABLET | Refills: 3 | Status: SHIPPED | OUTPATIENT
Start: 2024-05-02

## 2024-07-30 DIAGNOSIS — Z30.09 FAMILY PLANNING COUNSELING: ICD-10-CM

## 2024-07-30 RX ORDER — NORETHINDRONE AND ETHINYL ESTRADIOL AND FERROUS FUMARATE 0.8-25(24)
1 KIT ORAL DAILY
Qty: 28 TABLET | Refills: 4 | Status: SHIPPED | OUTPATIENT
Start: 2024-07-30

## 2024-07-30 NOTE — TELEPHONE ENCOUNTER
Patient asking for refill of noreth-ethinyl estradioL-iron 0.8mg-25mcg(24) and 75 mg (4) tablet,chewable to [facility] in [place]

## 2024-08-02 DIAGNOSIS — G43.709 CHRONIC MIGRAINE WITHOUT AURA WITHOUT STATUS MIGRAINOSUS, NOT INTRACTABLE: ICD-10-CM

## 2024-08-02 RX ORDER — RIZATRIPTAN BENZOATE 10 MG/1
TABLET ORAL
Qty: 9 TABLET | Refills: 3 | Status: SHIPPED | OUTPATIENT
Start: 2024-08-02

## 2024-08-02 RX ORDER — BENZONATATE 200 MG/1
1 CAPSULE ORAL
COMMUNITY
Start: 2024-02-16

## 2024-08-02 RX ORDER — DOXYCYCLINE 100 MG/1
100 CAPSULE ORAL 2 TIMES DAILY
COMMUNITY
Start: 2024-07-12

## 2024-10-01 DIAGNOSIS — F41.9 ANXIETY: ICD-10-CM

## 2024-10-02 RX ORDER — ESCITALOPRAM OXALATE 20 MG/1
20 TABLET ORAL DAILY
Qty: 90 TABLET | Refills: 3 | Status: SHIPPED | OUTPATIENT
Start: 2024-10-02 | End: 2024-10-03 | Stop reason: SDUPTHER

## 2024-10-03 ENCOUNTER — APPOINTMENT (OUTPATIENT)
Dept: PRIMARY CARE | Facility: CLINIC | Age: 34
End: 2024-10-03
Payer: COMMERCIAL

## 2024-10-03 VITALS
OXYGEN SATURATION: 94 % | DIASTOLIC BLOOD PRESSURE: 80 MMHG | HEART RATE: 84 BPM | RESPIRATION RATE: 16 BRPM | TEMPERATURE: 98.2 F | WEIGHT: 136.9 LBS | HEIGHT: 63 IN | SYSTOLIC BLOOD PRESSURE: 120 MMHG | BODY MASS INDEX: 24.26 KG/M2

## 2024-10-03 DIAGNOSIS — Z13.89 SCREENING FOR NEPHROPATHY: ICD-10-CM

## 2024-10-03 DIAGNOSIS — Z13.0 SCREENING FOR DEFICIENCY ANEMIA: ICD-10-CM

## 2024-10-03 DIAGNOSIS — F41.9 ANXIETY: ICD-10-CM

## 2024-10-03 DIAGNOSIS — Z13.220 SCREENING FOR HYPERLIPIDEMIA: ICD-10-CM

## 2024-10-03 DIAGNOSIS — Z13.29 SCREENING FOR THYROID DISORDER: ICD-10-CM

## 2024-10-03 DIAGNOSIS — I10 BENIGN ESSENTIAL HYPERTENSION: ICD-10-CM

## 2024-10-03 DIAGNOSIS — Z00.00 HEALTHCARE MAINTENANCE: Primary | ICD-10-CM

## 2024-10-03 DIAGNOSIS — G43.709 CHRONIC MIGRAINE WITHOUT AURA WITHOUT STATUS MIGRAINOSUS, NOT INTRACTABLE: ICD-10-CM

## 2024-10-03 PROCEDURE — 1036F TOBACCO NON-USER: CPT

## 2024-10-03 PROCEDURE — 3008F BODY MASS INDEX DOCD: CPT

## 2024-10-03 PROCEDURE — 3074F SYST BP LT 130 MM HG: CPT

## 2024-10-03 PROCEDURE — 3079F DIAST BP 80-89 MM HG: CPT

## 2024-10-03 PROCEDURE — 99395 PREV VISIT EST AGE 18-39: CPT

## 2024-10-03 PROCEDURE — 99214 OFFICE O/P EST MOD 30 MIN: CPT

## 2024-10-03 RX ORDER — ESCITALOPRAM OXALATE 20 MG/1
20 TABLET ORAL DAILY
Qty: 90 TABLET | Refills: 3 | Status: SHIPPED | OUTPATIENT
Start: 2024-10-03

## 2024-10-03 RX ORDER — RIZATRIPTAN BENZOATE 10 MG/1
10 TABLET ORAL DAILY PRN
Qty: 12 TABLET | Refills: 3 | Status: SHIPPED | OUTPATIENT
Start: 2024-10-03

## 2024-10-03 RX ORDER — LISINOPRIL 10 MG/1
10 TABLET ORAL DAILY
Qty: 90 TABLET | Refills: 3 | Status: SHIPPED | OUTPATIENT
Start: 2024-10-03 | End: 2025-10-03

## 2024-10-03 ASSESSMENT — ENCOUNTER SYMPTOMS
MUSCULOSKELETAL NEGATIVE: 1
GASTROINTESTINAL NEGATIVE: 1
RESPIRATORY NEGATIVE: 1
EYES NEGATIVE: 1
CONSTITUTIONAL NEGATIVE: 1
ENDOCRINE NEGATIVE: 1
CARDIOVASCULAR NEGATIVE: 1
NEUROLOGICAL NEGATIVE: 1
HEMATOLOGIC/LYMPHATIC NEGATIVE: 1
PSYCHIATRIC NEGATIVE: 1

## 2024-10-03 ASSESSMENT — PAIN SCALES - GENERAL: PAINLEVEL: 0-NO PAIN

## 2024-10-03 NOTE — PATIENT INSTRUCTIONS
Thank you for coming to see me today.  If you have any questions or concerns following our visit, please contact the office.  Phone: [phone]    Follow up with me in 1 year or sooner as needed    1)  Get fasting labwork a few days prior to next visit.  The lab is down the [place] from our office.     2) Start mediterranean diet eating pattern to help lower cholesterol. Can check out [employer] for free mediterranean diet recipe ideas

## 2024-10-03 NOTE — PROGRESS NOTES
Subjective   Patient ID: Rika Vasquez is a 34 y.o. female who presents for CPE and follow up of anxiety and migraines.    Hasn't been able to get in with massage therapy, not having as many migraines. Maxalt helps relieve migraines.   Anxiety well controlled on current therapy.  Blood pressure well controlled, checking blood pressures at home which are running in the 120s/80s.     Diet: Cooking at home, eating healthy. Good amounts of lean proteins, fruits, veggies. Avoiding sugary beverages; sweets periodically  Exercise: Yoga daily for about 20-60 minutes and walking 30-60 minutes, exercise most days of the week  Weight: Stable  Water: Drinking about  oz per day  Sleep: Having issues stable through the night, feeling groggy in the morning. Getting about 3-4 hours at a time, up in the middle of the night for about an hour. Occurring most night.   Social: Single, lives alone in a single floor home with raised level, no issues with stairs. No children, 2 cats. Mother identified as support source  Professional: Full time     Review of Systems   Constitutional: Negative.    HENT: Negative.     Eyes: Negative.    Respiratory: Negative.     Cardiovascular: Negative.    Gastrointestinal: Negative.    Endocrine: Negative.    Genitourinary: Negative.    Musculoskeletal: Negative.    Skin: Negative.    Neurological: Negative.    Hematological: Negative.    Psychiatric/Behavioral: Negative.          Current Outpatient Medications   Medication Sig Dispense Refill    ACETAMINOPHEN-CAFFEINE ORAL -      magnesium oxide 400 mg magnesium capsule -      multivitamin tablet -      noreth-ethinyl estradioL-iron 0.8mg-25mcg(24) and 75 mg (4) tablet,chewable Chew 1 tablet once daily. 28 tablet 4    escitalopram (Lexapro) 20 mg tablet Take 1 tablet (20 mg) by mouth once daily. 90 tablet 3    lisinopril 10 mg tablet Take 1 tablet (10 mg) by mouth once daily. 90 tablet 3    rizatriptan (Maxalt) 10 mg tablet Take 1  "tablet (10 mg) by mouth once daily as needed for migraine. May repeat in 2 hours if unresolved. Do not exceed 30 mg in 24 hours. 12 tablet 3     No current facility-administered medications for this visit.     Past Surgical History:   Procedure Laterality Date    OTHER SURGICAL HISTORY  07/14/2022    Oral surgery    WISDOM TOOTH EXTRACTION  2013     Family History   Problem Relation Name Age of Onset    Hypertension Mother Jessenia kessler     Migraines Mother Jessenia kessler     Diabetes Mother Jessenia kessler     Other (Cardiac disorder) Father Ed victoria     Hypertension Father Ed victoria     Migraines Father Ed victoria     Heart attack Father Ed victoria     Arthritis Father Ed victoria     Blood clot Father Ed victoria     Hearing loss Father Ed victoria     Heart disease Father Ed victoria     Other (Cardiac disorder) Father's Brother Richard kessler     Hypertension Father's Brother Richard kessler     Migraines Father's Brother Richard kessler     Heart attack Father's Brother Richard kessler     Other (Cardiac disorder) Other      Hypertension Other      Migraines Other      Heart attack Other        Social History     Tobacco Use    Smoking status: Never     Passive exposure: Never    Smokeless tobacco: Never   Vaping Use    Vaping status: Never Used   Substance Use Topics    Alcohol use: Yes     Alcohol/week: 1.0 standard drink of alcohol     Types: 1 Cans of beer per week     Comment: This is only monthy if that.    Drug use: Never        Objective     Visit Vitals  /80 (BP Location: Left arm, Patient Position: Sitting, BP Cuff Size: Adult)   Pulse 84   Temp 36.8 °C (98.2 °F) (Temporal)   Resp 16   Ht 1.6 m (5' 3\")   Wt 62.1 kg (136 lb 14.4 oz)   SpO2 94%   BMI 24.25 kg/m²   OB Status Having periods   Smoking Status Never   BSA 1.66 m²        Physical Exam  Constitutional:       Appearance: Normal appearance.   HENT:      Head: Normocephalic and atraumatic.      Nose: Rhinorrhea present.   Eyes:      Extraocular Movements: Extraocular movements intact. "      Pupils: Pupils are equal, round, and reactive to light.   Cardiovascular:      Rate and Rhythm: Normal rate and regular rhythm.   Pulmonary:      Effort: Pulmonary effort is normal.      Breath sounds: Normal breath sounds.   Abdominal:      General: Abdomen is flat. Bowel sounds are normal.      Palpations: Abdomen is soft.   Musculoskeletal:         General: Normal range of motion.   Skin:     General: Skin is warm and dry.      Capillary Refill: Capillary refill takes less than 2 seconds.   Neurological:      General: No focal deficit present.      Mental Status: She is alert and oriented to person, place, and time.   Psychiatric:         Mood and Affect: Mood normal.         Behavior: Behavior normal.           Assessment/Plan   Problem List Items Addressed This Visit       Anxiety     Well controlled on current therapy    Continue escitalopram 20mg daily         Relevant Medications    escitalopram (Lexapro) 20 mg tablet    Migraines     Migraines well controlled with current therapy, no longer using muscle relaxer  Not needing massage therapy for neck tension    Continue stretching  Continue rizatriptan 10mg up to three times daily as needed for migraine onset  Continue PRN Excedrin migraine per package instructions         Relevant Medications    rizatriptan (Maxalt) 10 mg tablet    Benign essential hypertension    Relevant Medications    lisinopril 10 mg tablet    Healthcare maintenance - Primary     - Healthy diet, good quality and duration of sleep, healthy water intake, regular exercise and healthy weight discussed  Vaccines   Flu: Recommended annually, declined  Tdap: Unsure of last dose believes to have been done in the last 10 years  - GYN/PAP: Following with GYN annually  -Following with dentistry and optometry regularly  -No symptoms of depression/concerns for anxiety  -Feeling safe at home  -Skin cancer prevention           Other Visit Diagnoses       Screening for deficiency anemia         Relevant Orders    CBC    Screening for nephropathy        Relevant Orders    Lipid Panel    Screening for hyperlipidemia        Relevant Orders    Comprehensive Metabolic Panel    Screening for thyroid disorder        Relevant Orders    TSH with reflex to Free T4 if abnormal    Vitamin D 25-Hydroxy,Total (for eval of Vitamin D levels)            All pertinent lab work and results were reviewed with patient.     Follow up with me 1 year for NICOLE Murrell, APRN-CNS

## 2024-10-18 ENCOUNTER — APPOINTMENT (OUTPATIENT)
Dept: PRIMARY CARE | Facility: CLINIC | Age: 34
End: 2024-10-18
Payer: COMMERCIAL

## 2024-10-28 ENCOUNTER — OFFICE VISIT (OUTPATIENT)
Dept: URGENT CARE | Age: 34
End: 2024-10-28
Payer: COMMERCIAL

## 2024-10-28 ENCOUNTER — ANCILLARY PROCEDURE (OUTPATIENT)
Dept: URGENT CARE | Age: 34
End: 2024-10-28
Payer: COMMERCIAL

## 2024-10-28 VITALS
DIASTOLIC BLOOD PRESSURE: 84 MMHG | TEMPERATURE: 98.3 F | RESPIRATION RATE: 20 BRPM | HEART RATE: 83 BPM | SYSTOLIC BLOOD PRESSURE: 128 MMHG | OXYGEN SATURATION: 98 %

## 2024-10-28 DIAGNOSIS — J06.9 UPPER RESPIRATORY TRACT INFECTION, UNSPECIFIED TYPE: ICD-10-CM

## 2024-10-28 DIAGNOSIS — R05.1 ACUTE COUGH: Primary | ICD-10-CM

## 2024-10-28 PROCEDURE — 71046 X-RAY EXAM CHEST 2 VIEWS: CPT | Performed by: PHYSICIAN ASSISTANT

## 2024-10-28 PROCEDURE — 99203 OFFICE O/P NEW LOW 30 MIN: CPT | Performed by: PHYSICIAN ASSISTANT

## 2024-10-28 PROCEDURE — 3079F DIAST BP 80-89 MM HG: CPT | Performed by: PHYSICIAN ASSISTANT

## 2024-10-28 PROCEDURE — 3074F SYST BP LT 130 MM HG: CPT | Performed by: PHYSICIAN ASSISTANT

## 2024-10-28 RX ORDER — BENZONATATE 100 MG/1
100 CAPSULE ORAL EVERY 6 HOURS PRN
Qty: 30 CAPSULE | Refills: 0 | Status: SHIPPED | OUTPATIENT
Start: 2024-10-28

## 2024-10-28 ASSESSMENT — ENCOUNTER SYMPTOMS: COUGH: 1

## 2024-11-13 ENCOUNTER — OFFICE VISIT (OUTPATIENT)
Dept: URGENT CARE | Age: 34
End: 2024-11-13
Payer: COMMERCIAL

## 2024-11-13 VITALS
HEART RATE: 97 BPM | SYSTOLIC BLOOD PRESSURE: 128 MMHG | TEMPERATURE: 97.6 F | OXYGEN SATURATION: 98 % | DIASTOLIC BLOOD PRESSURE: 75 MMHG

## 2024-11-13 DIAGNOSIS — J40 BRONCHITIS: Primary | ICD-10-CM

## 2024-11-13 PROCEDURE — 1036F TOBACCO NON-USER: CPT

## 2024-11-13 PROCEDURE — 3078F DIAST BP <80 MM HG: CPT

## 2024-11-13 PROCEDURE — 99213 OFFICE O/P EST LOW 20 MIN: CPT

## 2024-11-13 PROCEDURE — 3074F SYST BP LT 130 MM HG: CPT

## 2024-11-13 RX ORDER — CYCLOBENZAPRINE HCL 10 MG
10 TABLET ORAL NIGHTLY PRN
Qty: 30 TABLET | Refills: 0 | Status: SHIPPED | OUTPATIENT
Start: 2024-11-13 | End: 2025-01-12

## 2024-11-13 RX ORDER — PREDNISONE 20 MG/1
40 TABLET ORAL DAILY
Qty: 10 TABLET | Refills: 0 | Status: SHIPPED | OUTPATIENT
Start: 2024-11-13 | End: 2024-11-18

## 2024-11-13 RX ORDER — ALBUTEROL SULFATE 90 UG/1
2 INHALANT RESPIRATORY (INHALATION) EVERY 4 HOURS PRN
Qty: 20.1 G | Refills: 0 | Status: SHIPPED | OUTPATIENT
Start: 2024-11-13 | End: 2025-11-13

## 2024-11-14 NOTE — PROGRESS NOTES
Subjective   Patient ID: Rika Vasquez is a 34 y.o. female. They present today with a chief complaint of Cough (Lt side pain from coughing).    History of Present Illness  HPI a 34-year-old female arrives to clinic with chief complaint of left-sided rib pain.  The patient reports that she was seen and treated at various urgent cares prior to this encounter for coughing spells with the most recent being last week where she was diagnosed with a sinus infection was given doxycycline.  She reports that all of her symptoms of sinus pressure, shortness of breath and wheezing have gone away other than this cough.  She has tried over-the-counter medications to help decrease the coughing spells however there was no success.  She now reports left-sided rib pain that she believes is due to the cough.  She is here for further evaluation helping us.    Past Medical History  Allergies as of 11/13/2024    (No Known Allergies)       (Not in a hospital admission)       Past Medical History:   Diagnosis Date    Allergic 2010    Anxiety Jan 2023    Headache, unspecified     Chronic headaches    Hypertension Jan 2023    Other conditions influencing health status     Chronic migraine    Other seasonal allergic rhinitis     Seasonal allergies    Personal history of other diseases of the musculoskeletal system and connective tissue     History of neck pain       Past Surgical History:   Procedure Laterality Date    OTHER SURGICAL HISTORY  07/14/2022    Oral surgery    WISDOM TOOTH EXTRACTION  2013        reports that she has never smoked. She has never been exposed to tobacco smoke. She has never used smokeless tobacco. She reports current alcohol use of about 1.0 standard drink of alcohol per week. She reports that she does not use drugs.    Review of Systems  Review of Systems  Rib pain, arthralgia, muscle pain    Objective    Vitals:    11/13/24 1858   BP: 128/75   Pulse: 97   Temp: 36.4 °C (97.6 °F)   SpO2: 98%     No LMP  recorded.    Physical Exam  Pain and tenderness upon palpation to the left lower rib  Procedures    Point of Care Test & Imaging Results from this visit  No results found for this visit on 11/13/24.   No results found.    Diagnostic study results (if any) were reviewed by ELINOR Curry.    Assessment/Plan   Allergies, medications, history, and pertinent labs/EKGs/Imaging reviewed by ELINOR Curry.     Medical Decision Making  Upon initial assessment, the patient was sitting calmly bedside chair in no acute/respiratory distress.  The patient does report pain and tenderness upon palpation to the left lower rib.  It does not radiate to her back or to her chest.  No dizziness, shortness of breath.  There is evidence of a nonproductive cough.    Given her symptoms, this is likely musculoskeletal due to the frequent coughing.  I recommend that you continue your doxycycline to prevent antibiotic resistance as you report you are long term done with it.  I did send over Flexeril 10 mg oral tablet at bedtime, an albuterol inhaler, and prednisone 20 mg oral tablet twice a day for 5 days for your symptoms.  Follow-up with your primary care provider.  Patient agrees to plan of care was discharged in stable condition.    Nonpharmacological therapy was discussed as well.    As a result of the work-up, the patient was discharged home.  she was informed of her diagnosis and instructed to come back with any concerns or worsening of condition.  she and was agreeable to the plan as discussed above.  she was given the opportunity to ask questions.  All of the patient's questions were answered.    This document was generated using the assistance of voice recognition software. If there are any errors of spelling, grammar, syntax, or meaning; please feel free to contact me directly for clarification.     Orders and Diagnoses  Diagnoses and all orders for this visit:  Bronchitis  -     albuterol (Ventolin HFA) 90  mcg/actuation inhaler; Inhale 2 puffs every 4 hours if needed for wheezing or shortness of breath.  -     predniSONE (Deltasone) 20 mg tablet; Take 2 tablets (40 mg) by mouth once daily for 5 days.  -     cyclobenzaprine (Flexeril) 10 mg tablet; Take 1 tablet (10 mg) by mouth as needed at bedtime for muscle spasms.      Medical Admin Record      Patient disposition: Home    Electronically signed by ELINOR Curry  7:17 PM

## 2024-12-21 DIAGNOSIS — G43.709 CHRONIC MIGRAINE WITHOUT AURA WITHOUT STATUS MIGRAINOSUS, NOT INTRACTABLE: ICD-10-CM

## 2024-12-22 ENCOUNTER — OFFICE VISIT (OUTPATIENT)
Dept: URGENT CARE | Age: 34
End: 2024-12-22
Payer: COMMERCIAL

## 2024-12-22 VITALS
TEMPERATURE: 97.4 F | HEART RATE: 85 BPM | RESPIRATION RATE: 20 BRPM | OXYGEN SATURATION: 99 % | SYSTOLIC BLOOD PRESSURE: 135 MMHG | DIASTOLIC BLOOD PRESSURE: 84 MMHG

## 2024-12-22 DIAGNOSIS — B96.89 BACTERIAL SINUSITIS: Primary | ICD-10-CM

## 2024-12-22 DIAGNOSIS — J32.9 BACTERIAL SINUSITIS: Primary | ICD-10-CM

## 2024-12-22 RX ORDER — PREDNISONE 20 MG/1
40 TABLET ORAL DAILY
Qty: 10 TABLET | Refills: 0 | Status: SHIPPED | OUTPATIENT
Start: 2024-12-22 | End: 2024-12-27

## 2024-12-22 RX ORDER — AMOXICILLIN AND CLAVULANATE POTASSIUM 875; 125 MG/1; MG/1
1 TABLET, FILM COATED ORAL 2 TIMES DAILY
Qty: 20 TABLET | Refills: 0 | Status: SHIPPED | OUTPATIENT
Start: 2024-12-22 | End: 2025-01-01

## 2024-12-22 NOTE — PROGRESS NOTES
Subjective   Patient ID: Rika Vasquez is a 34 y.o. female. They present today with a chief complaint of Nasal Congestion and Sinusitis (Sinus pressure and congestion for 2 weeks).    History of Present Illness  HPI a 34-year-old female arrives to the clinic with chief complaint of nasal congestion and sinusitis.  The patient reports having the symptoms over the last 2 weeks.  She has not use any over the medications other than generic cough and cold.  She is here for further evaluation health maintenance.    Past Medical History  Allergies as of 12/22/2024    (No Known Allergies)       (Not in a hospital admission)       Past Medical History:   Diagnosis Date    Allergic 2010    Anxiety Jan 2023    Headache, unspecified     Chronic headaches    Hypertension Jan 2023    Other conditions influencing health status     Chronic migraine    Other seasonal allergic rhinitis     Seasonal allergies    Personal history of other diseases of the musculoskeletal system and connective tissue     History of neck pain       Past Surgical History:   Procedure Laterality Date    OTHER SURGICAL HISTORY  07/14/2022    Oral surgery    WISDOM TOOTH EXTRACTION  2013        reports that she has never smoked. She has never been exposed to tobacco smoke. She has never used smokeless tobacco. She reports current alcohol use of about 1.0 standard drink of alcohol per week. She reports that she does not use drugs.    Review of Systems  Review of Systems  Nasal congestion, cough, sore throat, head fullness    Objective    Vitals:    12/22/24 1347   BP: 135/84   Pulse: 85   Resp: 20   Temp: 36.3 °C (97.4 °F)   SpO2: 99%     No LMP recorded.    Physical Exam  Erythematous pharynx  Procedures    Point of Care Test & Imaging Results from this visit  No results found for this visit on 12/22/24.   No results found.    Diagnostic study results (if any) were reviewed by ELINOR Curry.    Assessment/Plan   Allergies, medications, history, and  pertinent labs/EKGs/Imaging reviewed by ELINOR Curry.     Medical Decision Making  Upon initial assessment, physical examination does reveal erythematous pharynx without tonsillar exudates or enlarged tonsils.  Rest of physical exam is unremarkable.  Given the longevity of her symptoms, it is reasonable to prescribe an antibiotic for a sinus infection.  I did send over Augmentin and prednisone.  Over-the-counter Zyrtec and Flonase.  Follow-up with primary care provider.    As a result of the work-up, the patient was discharged home.  she was informed of her diagnosis and instructed to come back with any concerns or worsening of condition.  she and was agreeable to the plan as discussed above.  she was given the opportunity to ask questions.  All of the patient's questions were answered.    This document was generated using the assistance of voice recognition software. If there are any errors of spelling, grammar, syntax, or meaning; please feel free to contact me directly for clarification.    Orders and Diagnoses  There are no diagnoses linked to this encounter.    Medical Admin Record      Patient disposition: Home    Electronically signed by ELINOR Curry  1:59 PM

## 2024-12-23 RX ORDER — RIZATRIPTAN BENZOATE 10 MG/1
TABLET ORAL
Qty: 9 TABLET | Refills: 3 | Status: SHIPPED | OUTPATIENT
Start: 2024-12-23

## 2025-02-10 ENCOUNTER — APPOINTMENT (OUTPATIENT)
Dept: OBSTETRICS AND GYNECOLOGY | Facility: CLINIC | Age: 35
End: 2025-02-10
Payer: COMMERCIAL

## 2025-02-10 VITALS — WEIGHT: 134 LBS | DIASTOLIC BLOOD PRESSURE: 78 MMHG | SYSTOLIC BLOOD PRESSURE: 120 MMHG | BODY MASS INDEX: 23.74 KG/M2

## 2025-02-10 DIAGNOSIS — Z01.419 ENCOUNTER FOR ANNUAL ROUTINE GYNECOLOGICAL EXAMINATION: Primary | ICD-10-CM

## 2025-02-10 DIAGNOSIS — Z30.09 FAMILY PLANNING COUNSELING: ICD-10-CM

## 2025-02-10 PROCEDURE — 1036F TOBACCO NON-USER: CPT | Performed by: OBSTETRICS & GYNECOLOGY

## 2025-02-10 PROCEDURE — 99385 PREV VISIT NEW AGE 18-39: CPT | Performed by: OBSTETRICS & GYNECOLOGY

## 2025-02-10 PROCEDURE — 3078F DIAST BP <80 MM HG: CPT | Performed by: OBSTETRICS & GYNECOLOGY

## 2025-02-10 PROCEDURE — 3074F SYST BP LT 130 MM HG: CPT | Performed by: OBSTETRICS & GYNECOLOGY

## 2025-02-10 RX ORDER — NORETHINDRONE AND ETHINYL ESTRADIOL AND FERROUS FUMARATE 0.8-25(24)
1 KIT ORAL DAILY
Qty: 90 TABLET | Refills: 4 | Status: SHIPPED | OUTPATIENT
Start: 2025-02-10

## 2025-02-10 SDOH — ECONOMIC STABILITY: FOOD INSECURITY: WITHIN THE PAST 12 MONTHS, THE FOOD YOU BOUGHT JUST DIDN'T LAST AND YOU DIDN'T HAVE MONEY TO GET MORE.: NEVER TRUE

## 2025-02-10 SDOH — ECONOMIC STABILITY: TRANSPORTATION INSECURITY
IN THE PAST 12 MONTHS, HAS THE LACK OF TRANSPORTATION KEPT YOU FROM MEDICAL APPOINTMENTS OR FROM GETTING MEDICATIONS?: NO

## 2025-02-10 SDOH — ECONOMIC STABILITY: TRANSPORTATION INSECURITY
IN THE PAST 12 MONTHS, HAS LACK OF TRANSPORTATION KEPT YOU FROM MEETINGS, WORK, OR FROM GETTING THINGS NEEDED FOR DAILY LIVING?: NO

## 2025-02-10 SDOH — ECONOMIC STABILITY: FOOD INSECURITY: WITHIN THE PAST 12 MONTHS, YOU WORRIED THAT YOUR FOOD WOULD RUN OUT BEFORE YOU GOT MONEY TO BUY MORE.: NEVER TRUE

## 2025-03-23 DIAGNOSIS — G43.709 CHRONIC MIGRAINE WITHOUT AURA WITHOUT STATUS MIGRAINOSUS, NOT INTRACTABLE: ICD-10-CM

## 2025-03-24 RX ORDER — RIZATRIPTAN BENZOATE 10 MG/1
TABLET ORAL
Qty: 12 TABLET | Refills: 3 | Status: SHIPPED | OUTPATIENT
Start: 2025-03-24

## 2025-04-28 ENCOUNTER — OFFICE VISIT (OUTPATIENT)
Dept: URGENT CARE | Age: 35
End: 2025-04-28
Payer: COMMERCIAL

## 2025-04-28 VITALS
SYSTOLIC BLOOD PRESSURE: 126 MMHG | OXYGEN SATURATION: 97 % | TEMPERATURE: 97.4 F | HEART RATE: 74 BPM | RESPIRATION RATE: 16 BRPM | WEIGHT: 125 LBS | BODY MASS INDEX: 23 KG/M2 | HEIGHT: 62 IN | DIASTOLIC BLOOD PRESSURE: 78 MMHG

## 2025-04-28 DIAGNOSIS — J01.90 ACUTE SINUSITIS, RECURRENCE NOT SPECIFIED, UNSPECIFIED LOCATION: Primary | ICD-10-CM

## 2025-04-28 DIAGNOSIS — J02.9 SORE THROAT: ICD-10-CM

## 2025-04-28 DIAGNOSIS — B34.8 RHINOVIRUS INFECTION: ICD-10-CM

## 2025-04-28 PROBLEM — G43.909 MIGRAINE HEADACHE: Status: ACTIVE | Noted: 2025-04-28

## 2025-04-28 PROBLEM — J30.2 SEASONAL ALLERGIES: Status: ACTIVE | Noted: 2025-04-28

## 2025-04-28 PROBLEM — G89.29 CHRONIC HEADACHE DISORDER: Status: ACTIVE | Noted: 2025-04-28

## 2025-04-28 PROBLEM — R51.9 CHRONIC HEADACHE DISORDER: Status: ACTIVE | Noted: 2025-04-28

## 2025-04-28 LAB
POC HUMAN RHINOVIRUS PCR: POSITIVE
POC INFLUENZA A VIRUS PCR: NEGATIVE
POC INFLUENZA B VIRUS PCR: NEGATIVE
POC RESPIRATORY SYNCYTIAL VIRUS PCR: NEGATIVE
POC STREPTOCOCCUS PYOGENES (GROUP A STREP) PCR: NEGATIVE

## 2025-04-28 PROCEDURE — 87651 STREP A DNA AMP PROBE: CPT | Performed by: NURSE PRACTITIONER

## 2025-04-28 PROCEDURE — 87631 RESP VIRUS 3-5 TARGETS: CPT | Performed by: NURSE PRACTITIONER

## 2025-04-28 PROCEDURE — 99213 OFFICE O/P EST LOW 20 MIN: CPT | Performed by: NURSE PRACTITIONER

## 2025-04-28 PROCEDURE — 3074F SYST BP LT 130 MM HG: CPT | Performed by: NURSE PRACTITIONER

## 2025-04-28 PROCEDURE — 1036F TOBACCO NON-USER: CPT | Performed by: NURSE PRACTITIONER

## 2025-04-28 PROCEDURE — 3008F BODY MASS INDEX DOCD: CPT | Performed by: NURSE PRACTITIONER

## 2025-04-28 PROCEDURE — 3078F DIAST BP <80 MM HG: CPT | Performed by: NURSE PRACTITIONER

## 2025-04-28 RX ORDER — AMOXICILLIN AND CLAVULANATE POTASSIUM 875; 125 MG/1; MG/1
1 TABLET, FILM COATED ORAL 2 TIMES DAILY
Qty: 20 TABLET | Refills: 0 | Status: SHIPPED | OUTPATIENT
Start: 2025-04-28 | End: 2025-05-08

## 2025-04-28 ASSESSMENT — ENCOUNTER SYMPTOMS
LOSS OF SENSATION IN FEET: 0
SORE THROAT: 1
STRIDOR: 0
SHORTNESS OF BREATH: 0
DEPRESSION: 0
OCCASIONAL FEELINGS OF UNSTEADINESS: 0
DIARRHEA: 0
NECK PAIN: 0
TROUBLE SWALLOWING: 1
VOMITING: 0
SWOLLEN GLANDS: 0
COUGH: 1
HOARSE VOICE: 0
ABDOMINAL PAIN: 0
HEADACHES: 1

## 2025-04-28 ASSESSMENT — PATIENT HEALTH QUESTIONNAIRE - PHQ9
1. LITTLE INTEREST OR PLEASURE IN DOING THINGS: NOT AT ALL
2. FEELING DOWN, DEPRESSED OR HOPELESS: NOT AT ALL
SUM OF ALL RESPONSES TO PHQ9 QUESTIONS 1 AND 2: 0

## 2025-04-28 NOTE — PROGRESS NOTES
Subjective   Patient ID: Rika Vasquez is a 35 y.o. female. They present today with a chief complaint of Sore Throat (C/O sore throat, drainage, sinus pressure, sx since Friday. ).    History of Present Illness    History provided by:  Patient   used: No    Sore Throat   This is a new problem. The current episode started 1 to 4 weeks ago. The problem has been gradually worsening. Neither side of throat is experiencing more pain than the other. There has been no fever. The pain is at a severity of 5/10. The pain is moderate. Associated symptoms include congestion, coughing, headaches and trouble swallowing. Pertinent negatives include no abdominal pain, diarrhea, drooling, ear discharge, ear pain, hoarse voice, plugged ear sensation, neck pain, shortness of breath, stridor, swollen glands or vomiting. Associated symptoms comments: Purulent discharge, bodyaches. She has had no exposure to strep or mono. She has tried nothing for the symptoms. The treatment provided no relief.       Past Medical History  Allergies as of 04/28/2025    (No Known Allergies)       Prescriptions Prior to Admission[1]     Medical History[2]    Surgical History[3]     reports that she has never smoked. She has never been exposed to tobacco smoke. She has never used smokeless tobacco. She reports current alcohol use of about 1.0 standard drink of alcohol per week. She reports that she does not use drugs.    Review of Systems  Review of Systems   HENT:  Positive for congestion, sore throat and trouble swallowing. Negative for drooling, ear discharge, ear pain and hoarse voice.    Respiratory:  Positive for cough. Negative for shortness of breath and stridor.    Gastrointestinal:  Negative for abdominal pain, diarrhea and vomiting.   Musculoskeletal:  Negative for neck pain.   Neurological:  Positive for headaches.            Objective    Vitals:    04/28/25 1307   BP: 126/78   BP Location: Left arm   Patient Position: Sitting  "  BP Cuff Size: Adult   Pulse: 74   Resp: 16   Temp: 36.3 °C (97.4 °F)   TempSrc: Oral   SpO2: 97%   Weight: 56.7 kg (125 lb)   Height: 1.575 m (5' 2\")     No LMP recorded.    Physical Exam  Vitals and nursing note reviewed.   Constitutional:       Appearance: Normal appearance.   HENT:      Head: Normocephalic and atraumatic.      Right Ear: Hearing, ear canal and external ear normal. A middle ear effusion (Clear fluid) is present.      Left Ear: Hearing, ear canal and external ear normal. A middle ear effusion (Clear fluid) is present.      Nose: Nasal tenderness, mucosal edema and congestion present. No nasal deformity, septal deviation, signs of injury, laceration or rhinorrhea.      Right Sinus: Maxillary sinus tenderness and frontal sinus tenderness present.      Left Sinus: Maxillary sinus tenderness and frontal sinus tenderness present.      Mouth/Throat:      Lips: Pink.      Mouth: Mucous membranes are moist.      Pharynx: Uvula midline.      Tonsils: No tonsillar exudate or tonsillar abscesses.   Cardiovascular:      Rate and Rhythm: Normal rate and regular rhythm.      Heart sounds: Normal heart sounds.   Pulmonary:      Effort: Pulmonary effort is normal.      Breath sounds: Normal breath sounds and air entry.   Musculoskeletal:      Cervical back: Normal range of motion and neck supple.   Lymphadenopathy:      Cervical: No cervical adenopathy.   Neurological:      Mental Status: She is alert.   Psychiatric:         Mood and Affect: Mood normal.         Behavior: Behavior normal.         Procedures    Point of Care Test & Imaging Results from this visit  Results for orders placed or performed in visit on 04/28/25   POCT SPOTFIRE R/ST Panel Mini w/Strep A ([facility]) manually resulted   Result Value Ref Range    POC Group A Strep, PCR Negative Negative    POC Respiratory Syncytial Virus PCR Negative Negative    POC Influenza A Virus PCR Negative Negative    POC Influenza B Virus PCR Negative Negative    " POC Human Rhinovirus PCR Positive (A) Negative      Imaging  No results found.    Cardiology, Vascular, and Other Imaging  No other imaging results found for the past 2 days      Diagnostic study results (if any) were reviewed by ELINOR De La Cruz.    Assessment/Plan   Allergies, medications, history, and pertinent labs/EKGs/Imaging reviewed by ELINOR De La Cruz.     Medical Decision Making  Positive for rhinovirus.  Take the antibiotic with food.  Eat yogurt or take probiotic once a day.  Symptoms should improve in 2 to 3 days.   Flonase 1 spray in each nostril two times daily as needed for nasal congestion.  Patient has an albuterol inhaler at home that she can use if she needs it.  So far patient did not have to use it.  Use disposable tissues instead of handkerchiefs.  Increase fluid intake and rest as needed.  Take Tylenol and/or ibuprofen as needed for aches and pain and/or fever.  Return or follow-up with primary care provider if symptoms did not improve.  Call 911 or go to the nearest emergency room if symptoms became severe such as fever of 102.5 degrees Fahrenheit or 39.2 degrees Celsius, severe pain, shortness of breath, chest tightness.   Patient verbalized understanding of the instructions and left in stable condition.     Orders and Diagnoses  Diagnoses and all orders for this visit:  Acute sinusitis, recurrence not specified, unspecified location  -     amoxicillin-clavulanate (Augmentin) 875-125 mg tablet; Take 1 tablet by mouth 2 times a day for 10 days.  Sore throat  -     POCT SPOTFIRE R/ST Panel Mini w/Strep A ([facility]) manually resulted  Rhinovirus infection      Medical Admin Record      Patient disposition: Home    Electronically signed by ELINOR De La Cruz  1:26 PM           [1] (Not in a hospital admission)   [2]   Past Medical History:  Diagnosis Date    Allergic 2010    Anxiety Jan 2023    Headache, unspecified     Chronic headaches    Hypertension Jan 2023    Migraine      Other conditions influencing health status     Chronic migraine    Other seasonal allergic rhinitis     Seasonal allergies    Personal history of other diseases of the musculoskeletal system and connective tissue     History of neck pain   [3]   Past Surgical History:  Procedure Laterality Date    OTHER SURGICAL HISTORY  07/14/2022    Oral surgery    WISDOM TOOTH EXTRACTION  2013

## 2025-05-15 ENCOUNTER — APPOINTMENT (OUTPATIENT)
Dept: PRIMARY CARE | Facility: CLINIC | Age: 35
End: 2025-05-15
Payer: COMMERCIAL

## 2025-05-15 VITALS
BODY MASS INDEX: 25.03 KG/M2 | DIASTOLIC BLOOD PRESSURE: 70 MMHG | OXYGEN SATURATION: 97 % | HEART RATE: 82 BPM | WEIGHT: 136 LBS | RESPIRATION RATE: 16 BRPM | SYSTOLIC BLOOD PRESSURE: 112 MMHG | HEIGHT: 62 IN | TEMPERATURE: 98.2 F

## 2025-05-15 DIAGNOSIS — G89.29 CHRONIC NONINTRACTABLE HEADACHE, UNSPECIFIED HEADACHE TYPE: ICD-10-CM

## 2025-05-15 DIAGNOSIS — M54.2 CHRONIC NECK PAIN: ICD-10-CM

## 2025-05-15 DIAGNOSIS — G43.709 CHRONIC MIGRAINE WITHOUT AURA WITHOUT STATUS MIGRAINOSUS, NOT INTRACTABLE: Primary | ICD-10-CM

## 2025-05-15 DIAGNOSIS — M54.50 CHRONIC MIDLINE LOW BACK PAIN WITHOUT SCIATICA: ICD-10-CM

## 2025-05-15 DIAGNOSIS — R45.89 DEPRESSED MOOD: ICD-10-CM

## 2025-05-15 DIAGNOSIS — R51.9 CHRONIC NONINTRACTABLE HEADACHE, UNSPECIFIED HEADACHE TYPE: ICD-10-CM

## 2025-05-15 DIAGNOSIS — B37.31 VAGINAL YEAST INFECTION: ICD-10-CM

## 2025-05-15 DIAGNOSIS — M62.838 MUSCLE SPASM: ICD-10-CM

## 2025-05-15 DIAGNOSIS — G89.29 CHRONIC NECK PAIN: ICD-10-CM

## 2025-05-15 DIAGNOSIS — G89.29 CHRONIC MIDLINE LOW BACK PAIN WITHOUT SCIATICA: ICD-10-CM

## 2025-05-15 PROCEDURE — 3008F BODY MASS INDEX DOCD: CPT

## 2025-05-15 PROCEDURE — 99213 OFFICE O/P EST LOW 20 MIN: CPT

## 2025-05-15 PROCEDURE — 3074F SYST BP LT 130 MM HG: CPT

## 2025-05-15 PROCEDURE — 3078F DIAST BP <80 MM HG: CPT

## 2025-05-15 PROCEDURE — 1036F TOBACCO NON-USER: CPT

## 2025-05-15 RX ORDER — FLUCONAZOLE 150 MG/1
150 TABLET ORAL ONCE
Qty: 1 TABLET | Refills: 0 | Status: SHIPPED | OUTPATIENT
Start: 2025-05-15 | End: 2025-05-15

## 2025-05-15 RX ORDER — METHOCARBAMOL 500 MG/1
500 TABLET, FILM COATED ORAL NIGHTLY
Qty: 60 TABLET | Refills: 3 | Status: SHIPPED | OUTPATIENT
Start: 2025-05-15

## 2025-05-15 SDOH — ECONOMIC STABILITY: FOOD INSECURITY: WITHIN THE PAST 12 MONTHS, THE FOOD YOU BOUGHT JUST DIDN'T LAST AND YOU DIDN'T HAVE MONEY TO GET MORE.: NEVER TRUE

## 2025-05-15 SDOH — ECONOMIC STABILITY: FOOD INSECURITY: WITHIN THE PAST 12 MONTHS, YOU WORRIED THAT YOUR FOOD WOULD RUN OUT BEFORE YOU GOT MONEY TO BUY MORE.: NEVER TRUE

## 2025-05-15 ASSESSMENT — ENCOUNTER SYMPTOMS
CONSTITUTIONAL NEGATIVE: 1
GASTROINTESTINAL NEGATIVE: 1
PSYCHIATRIC NEGATIVE: 1
OCCASIONAL FEELINGS OF UNSTEADINESS: 0
NECK PAIN: 1
LOSS OF SENSATION IN FEET: 0
RESPIRATORY NEGATIVE: 1
EYES NEGATIVE: 1
CARDIOVASCULAR NEGATIVE: 1
ENDOCRINE NEGATIVE: 1
HEADACHES: 1
DEPRESSION: 0
HEMATOLOGIC/LYMPHATIC NEGATIVE: 1

## 2025-05-15 ASSESSMENT — PATIENT HEALTH QUESTIONNAIRE - PHQ9
1. LITTLE INTEREST OR PLEASURE IN DOING THINGS: NOT AT ALL
SUM OF ALL RESPONSES TO PHQ9 QUESTIONS 1 & 2: 0
2. FEELING DOWN, DEPRESSED OR HOPELESS: NOT AT ALL
SUM OF ALL RESPONSES TO PHQ9 QUESTIONS 1 AND 2: 0
2. FEELING DOWN, DEPRESSED OR HOPELESS: NOT AT ALL
1. LITTLE INTEREST OR PLEASURE IN DOING THINGS: NOT AT ALL

## 2025-05-15 ASSESSMENT — COLUMBIA-SUICIDE SEVERITY RATING SCALE - C-SSRS
2. HAVE YOU ACTUALLY HAD ANY THOUGHTS OF KILLING YOURSELF?: NO
1. IN THE PAST MONTH, HAVE YOU WISHED YOU WERE DEAD OR WISHED YOU COULD GO TO SLEEP AND NOT WAKE UP?: NO
6. HAVE YOU EVER DONE ANYTHING, STARTED TO DO ANYTHING, OR PREPARED TO DO ANYTHING TO END YOUR LIFE?: NO

## 2025-05-15 ASSESSMENT — LIFESTYLE VARIABLES
AUDIT-C TOTAL SCORE: 1
HOW OFTEN DO YOU HAVE A DRINK CONTAINING ALCOHOL: MONTHLY OR LESS
SKIP TO QUESTIONS 9-10: 1
HOW MANY STANDARD DRINKS CONTAINING ALCOHOL DO YOU HAVE ON A TYPICAL DAY: 1 OR 2
HOW OFTEN DO YOU HAVE SIX OR MORE DRINKS ON ONE OCCASION: NEVER

## 2025-05-15 ASSESSMENT — PAIN SCALES - GENERAL: PAINLEVEL_OUTOF10: 7

## 2025-05-15 NOTE — ASSESSMENT & PLAN NOTE
Reports feeling more depressed due to chronic debilitating headaches, cannot be as active as she used to be  Currently maintained on escitalopram  Denies red flag symptoms such as SI/HI, inability to care for herself or her home, appetite disturbances    Discussed med changes today; thinks that if her headaches were addressed and she could be more active mood would improve  Low threshold to add concomitant therapy to escitalopram for depression

## 2025-05-15 NOTE — ASSESSMENT & PLAN NOTE
Endorsing tension and pain of left SCM radiating into her head  Following with massage therapy which is somewhat helpful but still needing rizatriptan frequently  Endorses mild depression due to not being able to be as active as she'd like due to debilitating headaches    Refer to physical medicine with Dr. Adams  Referral to PT today for stretching/strengthening  Start methocarbamol 500mg at bedtime as needed for neck tension  Continue with icing and acetaminophen

## 2025-05-15 NOTE — PROGRESS NOTES
Subjective   Patient ID: Rika Vasquez is a 35 y.o. female who presents for concerns of neck and back pain.    Following with massage therapy for presumed chronic pinched nerve of her neck causing headaches. Pain radiates to her upper back into showulders and lower back. Describes pain as 4-7/10 in general but rated 8-9/10 in colder weather.    Prescribed rizatriptan for migraines.   Will sometimes waking up with them, migraine medicine sometime helping with headaches. Some days around menstrual cycle or with stress will get severe migraine. With severe neck pain the rizatriptan relieves in the morning but headache comes back toward the end of the night. Starting to have tingling down the left arm and into pectoral muscles, happening once every few weeks.     Having low back pain which isn't as common as neck pain. Low back is mild, rated 3-4/10 intermittent more on the weekends due to not being as active as she was previously. 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    A few weeks ago had URI and was treated with antibiotics; concern for vaginal yeast infection.    Diet: Cooking at home, eating healthy. Good amounts of lean proteins, fruits, veggies. Avoiding sugary beverages; sweets periodically  Exercise: Yoga daily for about 20-60 minutes and walking 30-60 minutes, exercise most days of the week  Weight: Stable  Water: Drinking about  oz per day  Sleep: Having issues stable through the night, feeling groggy in the morning. Getting about 3-4 hours at a time, up in the middle of the night for about an hour. Occurring most night.   Social: Single, lives alone in a single floor home with raised level, no issues with stairs. No children, 2 cats. Mother identified as support source  Professional: Full time     Review of Systems   Constitutional: Negative.    HENT: Negative.     Eyes: Negative.    Respiratory: Negative.    "  Cardiovascular: Negative.    Gastrointestinal: Negative.    Endocrine: Negative.    Genitourinary: Negative.    Musculoskeletal:  Positive for neck pain.   Skin: Negative.    Neurological:  Positive for headaches.   Hematological: Negative.    Psychiatric/Behavioral: Negative.          Current Medications[1]  Surgical History[2]  Family History[3]   Social History[4]     Objective     Visit Vitals  /70 (BP Location: Right arm, Patient Position: Sitting, BP Cuff Size: Adult)   Pulse 82   Temp 36.8 °C (98.2 °F) (Temporal)   Resp 16   Ht 1.575 m (5' 2\")   Wt 61.7 kg (136 lb)   LMP 05/14/2025 (Exact Date)   SpO2 97%   BMI 24.87 kg/m²   OB Status Having periods   Smoking Status Never   BSA 1.64 m²        Physical Exam  Musculoskeletal:      Cervical back: Spasms and tenderness present.        Back:            Assessment/Plan   Problem List Items Addressed This Visit       Migraines - Primary    Relevant Medications    methocarbamol (Robaxin) 500 mg tablet    Other Relevant Orders    Referral to Physical Medicine Rehab    Referral to Physical Therapy    Referral to [facility]    Chronic headache disorder    Endorsing tension and pain of left SCM radiating into her head  Following with massage therapy which is somewhat helpful but still needing rizatriptan frequently  Endorses mild depression due to not being able to be as active as she'd like due to debilitating headaches    Refer to physical medicine with Dr. Adams  Referral to PT today for stretching/strengthening  Start methocarbamol 500mg at bedtime as needed for neck tension  Continue with icing and acetaminophen         Depressed mood    Reports feeling more depressed due to chronic debilitating headaches, cannot be as active as she used to be  Currently maintained on escitalopram  Denies red flag symptoms such as SI/HI, inability to care for herself or her home, appetite disturbances    Discussed med changes today; thinks that if her headaches " were addressed and she could be more active mood would improve  Low threshold to add concomitant therapy to escitalopram for depression          Other Visit Diagnoses         Muscle spasm        Relevant Medications    methocarbamol (Robaxin) 500 mg tablet    Other Relevant Orders    Referral to Physical Therapy      Chronic midline low back pain without sciatica        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     Follow up with me in October or sooner as needed    CURLY Wilcox-CNS         [1]   Current Outpatient Medications   Medication Sig Dispense Refill    ACETAMINOPHEN-CAFFEINE ORAL -      albuterol (Ventolin HFA) 90 mcg/actuation inhaler Inhale 2 puffs every 4 hours if needed for wheezing or shortness of breath. 20.1 g 0    escitalopram (Lexapro) 20 mg tablet Take 1 tablet (20 mg) by mouth once daily. 90 tablet 3    lisinopril 10 mg tablet Take 1 tablet (10 mg) by mouth once daily. 90 tablet 3    magnesium oxide 400 mg magnesium capsule -      multivitamin tablet -      noreth-ethinyl estradioL-iron 0.8mg-25mcg(24) and 75 mg (4) tablet,chewable Chew 1 tablet once daily. 90 tablet 4    rizatriptan (Maxalt) 10 mg tablet TAKE 1 TABLET (10 MG) BY MOUTH ONCE DAILY AS NEEDED FOR MIGRAINE. MAY REPEAT IN 2 HOURS IF UNRESOLVED. DO NOT EXCEED 30 MG IN 24 HOURS. 12 tablet 3    fluconazole (Diflucan) 150 mg tablet Take 1 tablet (150 mg) by mouth 1  time for 1 dose. 1 tablet 0    methocarbamol (Robaxin) 500 mg tablet Take 1 tablet (500 mg) by mouth once daily at bedtime. 60 tablet 3     No current facility-administered medications for this visit.   [2]   Past Surgical History:  Procedure Laterality Date    OTHER SURGICAL HISTORY  07/14/2022    Oral surgery    WISDOM TOOTH EXTRACTION  2013   [3]   Family History  Problem Relation Name Age of Onset    Hypertension Mother Jessenia kessler     Migraines Mother Jessenia kessler     Diabetes Mother Jessenia kessler     Other (Cardiac disorder) Father Ed victoria     Hypertension Father Ed victoria     Migraines Father Ed victoria     Heart attack Father Ed victoria     Arthritis Father Ed victoria     Blood clot Father Ed victoria     Hearing loss Father Ed victoria     Heart disease Father Ed victoria     Other (Cardiac disorder) Father's Brother Richard kessler     Hypertension Father's Brother Richard kessler     Migraines Father's Brother Richard kessler     Heart attack Father's Brother Richard kessler     Other (Cardiac disorder) Other      Hypertension Other      Migraines Other      Heart attack Other     [4]   Social History  Tobacco Use    Smoking status: Never     Passive exposure: Never    Smokeless tobacco: Never   Vaping Use    Vaping status: Never Used   Substance Use Topics    Alcohol use: Yes     Alcohol/week: 1.0 standard drink of alcohol     Types: 1 Cans of beer per week     Comment: This is only monthy if that.    Drug use: Never

## 2025-05-15 NOTE — PATIENT INSTRUCTIONS
Thank you for coming to see me today.  If you have any questions or concerns following our visit, please contact the office.  Phone: [phone]    Follow up with me in October as previously scheduled    1)  START methocarbamol 500mg at bedtime as needed for muscle spasm in neck/low back    2) I am referring you to physical therapy and physical medicine with Dr. Adams- please call [phone] to schedule an appointment or schedule at  on your way out     3) Take Diflucan 150mg once

## 2025-06-02 ENCOUNTER — EVALUATION (OUTPATIENT)
Dept: PHYSICAL THERAPY | Facility: HOSPITAL | Age: 35
End: 2025-06-02
Payer: COMMERCIAL

## 2025-06-02 DIAGNOSIS — M62.838 MUSCLE SPASM: ICD-10-CM

## 2025-06-02 DIAGNOSIS — G43.709 CHRONIC MIGRAINE WITHOUT AURA WITHOUT STATUS MIGRAINOSUS, NOT INTRACTABLE: ICD-10-CM

## 2025-06-02 DIAGNOSIS — G89.29 CHRONIC NECK PAIN: ICD-10-CM

## 2025-06-02 DIAGNOSIS — M54.2 CHRONIC NECK PAIN: ICD-10-CM

## 2025-06-02 PROBLEM — M54.50 CHRONIC MIDLINE LOW BACK PAIN WITHOUT SCIATICA: Status: ACTIVE | Noted: 2025-06-02

## 2025-06-02 PROCEDURE — 97110 THERAPEUTIC EXERCISES: CPT | Mod: GP | Performed by: PHYSICAL THERAPIST

## 2025-06-02 PROCEDURE — 97161 PT EVAL LOW COMPLEX 20 MIN: CPT | Mod: GP | Performed by: PHYSICAL THERAPIST

## 2025-06-02 ASSESSMENT — ENCOUNTER SYMPTOMS
DEPRESSION: 0
LOSS OF SENSATION IN FEET: 0
OCCASIONAL FEELINGS OF UNSTEADINESS: 0

## 2025-06-02 ASSESSMENT — PATIENT HEALTH QUESTIONNAIRE - PHQ9
SUM OF ALL RESPONSES TO PHQ9 QUESTIONS 1 AND 2: 2
2. FEELING DOWN, DEPRESSED OR HOPELESS: SEVERAL DAYS
1. LITTLE INTEREST OR PLEASURE IN DOING THINGS: SEVERAL DAYS

## 2025-06-02 ASSESSMENT — PAIN - FUNCTIONAL ASSESSMENT: PAIN_FUNCTIONAL_ASSESSMENT: 0-10

## 2025-06-02 ASSESSMENT — PAIN DESCRIPTION - DESCRIPTORS: DESCRIPTORS: POUNDING

## 2025-06-02 ASSESSMENT — PAIN SCALES - GENERAL: PAINLEVEL_OUTOF10: 4

## 2025-06-02 NOTE — PROGRESS NOTES
Physical Therapy    Physical Therapy Evaluation    Patient Name: Rika Vasquez  MRN: [id number]  Today's Date: 6/2/2025  Time Calculation  Start Time: 0800  Stop Time: 0845  Time Calculation (min): 45 min    PT Evaluation Time Entry  PT Evaluation (Low) Time Entry: 37  PT Therapeutic Procedures Time Entry  Therapeutic Exercise Time Entry: 8                   Visit # 1  Assessment  PT Assessment Results: Decreased range of motion, Decreased strength, Pain  Rehab Prognosis: Good  Evaluation/Treatment Tolerance: Patient tolerated treatment well      Plan  Treatment/Interventions: Cryotherapy, Hot pack, Education/ Instruction, Self care/ home management, Therapeutic exercises, Therapeutic activities, Electrical stimulation, Manual therapy  PT Plan: Skilled PT  Rehab Potential: Good  Plan of Care Agreement: Patient       Current Problem  1. Chronic migraine without aura without status migrainosus, not intractable  Referral to Physical Therapy    Follow Up In Physical Therapy      2. Muscle spasm  Referral to Physical Therapy    Follow Up In Physical Therapy      3. Chronic neck pain  Referral to Physical Therapy    Follow Up In Physical Therapy          Subjective   Pt. States ~8-12 y/o she fell down a flight of stairs and has been with neck pain more L sided since that time and constant HA. Pt. Denies LBP  Pt. Will see physical medicine  In July for testing.     Pt. States Chiropractic has not helped much over the years. She does seem with less pain with massage therapy but nothing that keeps the pain away longer than a week. She does cont. With massage therapy.     Pt. Also uses heat / ice, yoga and neck stretches.     Pt. Does have pain with sleeping, recreation, and HA.     Pt. Is c/o recent n/t in L arm and hand that comes and goes. She is not recognizing a pattern with this.     Pt. Denies dizziness.     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      General:  General  Reason for Referral: intitial eval  Referred By: CURLY Ojeda- CNS  Preferred Learning Style: verbal  Precautions:  Precautions  STEADI Fall Risk Score (The score of 4 or more indicates an increased risk of falling): 0  Medical Precautions:  (HA/ migraines; lisinopril.)       Pain:  Pain Assessment: 0-10  0-10 (Numeric) Pain Score: 4  Pain Type: Chronic pain  Pain Location: Head  Pain Orientation: Left, Right  Pain Radiating Towards: neck and L arm; R shld  Pain Descriptors: Pounding  Pain Frequency: Constant/continuous  Home Living:  wnl   Prior Function Per Pt/Caregiver Report:        Objective   Low back n/t d/t pt. Without c/o LBP.   Posture:    Slight scapular winging; scapular elevation; shlds internally rotated.     Notable hyperextension of elbows       Range of Motion:    AROM:   Flex: 20  Ext : 50  Sb: DARIO 35  Rot: 70 DARIO      Strength:    DARIO shld :  Flex: L4/5;  R wfl  Scap L 4+/5 R 5-/5  IR: DARIO 4/5  ER: DARIO 4/5    Elbow:   DARIO tricep : 4/5  DARIO bicep : 4+/5    Wrist:   DARIO flex / ext: 4+/5           Palpation:     TTT L SCM;   TTT PA mobs cspine c3-4 area    Special Tests:  Distraction without relief of pain             Other:  Visit 1: Eval and HEP 6/2/25    EXERCISES       Date       VISIT# # # # #    REPS REPS REPS REPS          pulleys       flex       scap              Arm bike              tband       row       Pull down       ir       er       tricep       bicep                     Prone:        Low I's       Low y's       t's                                                                             HEP                 Outcome Measures:     NDI 12  OP EDUCATION:  Access Code: [email or web address]  URL: [email or web address]  Date: 06/02/2025  Prepared by: Marguerite Mathew    Exercises  - Seated Scapular Retraction  - 5-7 x daily - 7 x weekly - 1 sets - 10 reps - 3sec hold  - Prone Scapular Retraction  - 1 x daily - 7 x weekly - 1-2 sets  - 10 reps  - Seated Correct Posture  - 1 x daily - 7 x weekly  Outpatient Education  Individual(s) Educated: Patient  Education Provided: Home Exercise Program, POC  Risk and Benefits Discussed with Patient/Caregiver/Other: yes  Patient/Caregiver Demonstrated Understanding: yes  Plan of Care Discussed and Agreed Upon: yes  Patient Response to Education: Patient/Caregiver Verbalized Understanding of Information    Goals:  Active       chronic neck pain; chronic migraine; weakness       Pt. will c/o less than 3/10 neck pain and HA pain.        Start:  06/02/25    Expected End:  08/02/25            Pt. Will be indep with progressive HEP to cont. Progress made in PT.        Start:  06/02/25    Expected End:  09/02/25            Pt. will increase posture 80% of session with less than 3 VC.        Start:  06/02/25    Expected End:  09/02/25            Pt. will increase UE DARIO strength to wnl to allow increase posture.        Start:  06/02/25    Expected End:  09/02/25

## 2025-06-04 ENCOUNTER — TREATMENT (OUTPATIENT)
Dept: PHYSICAL THERAPY | Facility: HOSPITAL | Age: 35
End: 2025-06-04
Payer: COMMERCIAL

## 2025-06-04 DIAGNOSIS — G89.29 CHRONIC NECK PAIN: ICD-10-CM

## 2025-06-04 DIAGNOSIS — G43.709 CHRONIC MIGRAINE WITHOUT AURA WITHOUT STATUS MIGRAINOSUS, NOT INTRACTABLE: ICD-10-CM

## 2025-06-04 DIAGNOSIS — M54.2 CHRONIC NECK PAIN: ICD-10-CM

## 2025-06-04 DIAGNOSIS — M62.838 MUSCLE SPASM: ICD-10-CM

## 2025-06-04 PROCEDURE — 97110 THERAPEUTIC EXERCISES: CPT | Mod: GP,CQ

## 2025-06-04 ASSESSMENT — PAIN - FUNCTIONAL ASSESSMENT: PAIN_FUNCTIONAL_ASSESSMENT: 0-10

## 2025-06-04 ASSESSMENT — PAIN SCALES - GENERAL: PAINLEVEL_OUTOF10: 3

## 2025-06-04 NOTE — PROGRESS NOTES
Physical Therapy    Physical Therapy Treatment    Patient Name: Rika Vasquez  MRN: [id number]  : 1990  Today's Date: 2025  Time Calculation  Start Time: 1015  Stop Time: 1055  Time Calculation (min): 40 min    PT Therapeutic Procedures Time Entry  Therapeutic Exercise Time Entry: 40          VISIT:# 2    Current Problem  Problem List Items Addressed This Visit           ICD-10-CM    Migraine headache G43.909    Chronic neck pain M54.2, G89.29    Muscle spasm M62.838        Subjective    No falls reported since last visit. Pt is working out at home     Pain  Pain Assessment: 0-10  0-10 (Numeric) Pain Score: 3  Pain Type: Chronic pain  Pain Location: Head          Objective    Started land based therapy             Precautions  Precautions  STEADI Fall Risk Score (The score of 4 or more indicates an increased risk of falling): 0  Medical Precautions:  (HA/ migraines; lisinopril.)      Treatments:   Visit 1: Eval and HEP 25    EXERCISES       Date 2025       VISIT# #2 # # #    REPS REPS REPS REPS          pulleys       flex 3'      scap 3'             Arm bike 5'             tband       row 10 x 2 red      Pull down 10 x 2 red      Ir steffany 10 x 2 red      Er steffany 10 x 2       tricep 10 x 2       bicep 10 x 2                     Prone:        Low I's 10 x 2      Low y's 10 x 2       T's palms  T's Thumbs 10 x 2   10 x 2                                          HEP                               Assessment:   Pt tolerated treatment without complaint of increase in symptoms.  Pt required cues with tband exercises for correct posture.         Plan: cont to improve strength and ROM   OP PT Plan  Treatment/Interventions: Cryotherapy, Hot pack, Education/ Instruction, Self care/ home management, Therapeutic exercises, Therapeutic activities, Electrical stimulation, Manual therapy  PT Plan: Skilled PT  Rehab Potential: Good  Plan of Care Agreement: Patient    Goals:  Active       chronic neck pain; chronic  migraine; weakness       Pt. will c/o less than 3/10 neck pain and HA pain.        Start:  06/02/25    Expected End:  08/02/25            Pt. Will be indep with progressive HEP to cont. Progress made in PT.        Start:  06/02/25    Expected End:  09/02/25            Pt. will increase posture 80% of session with less than 3 VC.        Start:  06/02/25    Expected End:  09/02/25            Pt. will increase UE DARIO strength to wnl to allow increase posture.        Start:  06/02/25    Expected End:  09/02/25

## 2025-06-10 ENCOUNTER — TREATMENT (OUTPATIENT)
Dept: PHYSICAL THERAPY | Facility: HOSPITAL | Age: 35
End: 2025-06-10
Payer: COMMERCIAL

## 2025-06-10 ENCOUNTER — DOCUMENTATION (OUTPATIENT)
Dept: PHYSICAL THERAPY | Facility: HOSPITAL | Age: 35
End: 2025-06-10
Payer: COMMERCIAL

## 2025-06-10 DIAGNOSIS — M62.838 MUSCLE SPASM: ICD-10-CM

## 2025-06-10 DIAGNOSIS — G43.709 CHRONIC MIGRAINE WITHOUT AURA WITHOUT STATUS MIGRAINOSUS, NOT INTRACTABLE: ICD-10-CM

## 2025-06-10 DIAGNOSIS — M54.2 CHRONIC NECK PAIN: ICD-10-CM

## 2025-06-10 DIAGNOSIS — G89.29 CHRONIC NECK PAIN: ICD-10-CM

## 2025-06-10 NOTE — PROGRESS NOTES
Physical Therapy                 Therapy Communication Note    Patient Name: Rika Vasquez  MRN: [id number]  : 1990  Today's Date: 6/10/2025     Discipline: Physical Therapy    Missed Time: Cancel    Missed Visit Reason:  Therapist didn't realize pt was here pt waited 20' after her start time and needed to leave.  Called pt and left her 3 other appts that were open if she would like to come back later this week.  Discussed the above with supervisor as it was my fault for missing her check in.

## 2025-06-12 ENCOUNTER — TREATMENT (OUTPATIENT)
Dept: PHYSICAL THERAPY | Facility: HOSPITAL | Age: 35
End: 2025-06-12
Payer: COMMERCIAL

## 2025-06-12 DIAGNOSIS — G43.709 CHRONIC MIGRAINE WITHOUT AURA WITHOUT STATUS MIGRAINOSUS, NOT INTRACTABLE: ICD-10-CM

## 2025-06-12 DIAGNOSIS — G89.29 CHRONIC NECK PAIN: ICD-10-CM

## 2025-06-12 DIAGNOSIS — M62.838 MUSCLE SPASM: ICD-10-CM

## 2025-06-12 DIAGNOSIS — M54.2 CHRONIC NECK PAIN: ICD-10-CM

## 2025-06-12 PROCEDURE — 97110 THERAPEUTIC EXERCISES: CPT | Mod: GP | Performed by: PHYSICAL THERAPIST

## 2025-06-12 ASSESSMENT — PAIN - FUNCTIONAL ASSESSMENT: PAIN_FUNCTIONAL_ASSESSMENT: 0-10

## 2025-06-12 ASSESSMENT — PAIN SCALES - GENERAL: PAINLEVEL_OUTOF10: 0 - NO PAIN

## 2025-06-12 NOTE — PROGRESS NOTES
Physical Therapy    Physical Therapy Treatment    Patient Name: Rika Vasquez  MRN: [id number]  Today's Date: 6/12/2025  Time Calculation  Start Time: 0830  Stop Time: 0912  Time Calculation (min): 42 min    [id number]     PT Therapeutic Procedures Time Entry  Therapeutic Exercise Time Entry: 42           Visit: # 3      Assessment:  Pt. Performed all therEx. With minimal to no verbal cues needed. Pt. Will cont. All previous and current therEx at home. Pt. Will follow up for last visit as needed.          Plan:  Cont. Increase all therEx as able for posture strengthening.      Current Problem  1. Chronic migraine without aura without status migrainosus, not intractable  Follow Up In Physical Therapy      2. Muscle spasm  Follow Up In Physical Therapy      3. Chronic neck pain  Follow Up In Physical Therapy          Subjective   Pt. States she is able to complete all the exercises performed last visit at home and she is looking to perform some other exercises in therapy.         Pain  Pain Assessment: 0-10  0-10 (Numeric) Pain Score: 0 - No pain  Pain Type: Chronic pain  Pain Location: Head    Objective     Educated pt. On focus of postural strengthening in PT and pt. Will cont. External massage therapy.   Pt. Denies needing a print out for the tband exercises and she is able to recreate at home with bands or weights.        Treatments:    Visit 1: Eval and HEP 6/2/25    EXERCISES       Date 6/4/2025 6/12/25     VISIT# #2 #3 # #    REPS REPS REPS REPS          pulleys       flex 3' 3'     scap 3' 3'            Arm bike 5' 6'            tband       row 10 x 2 red      Pull down 10 x 2 red      Ir dario 10 x 2 red      Er dario 10 x 2       tricep 10 x 2       bicep 10 x 2              Pnf d1 and d2 flex / ext ea.   Red 20x1 all DARIO     Wall pushups   20x2            Forearm planks  30seconds x 3     Side planks                     Prone:        Low I's 10 x 2 2lbs 10x2     Low y's 10 x 2  2 lbs 10x2     T's palms  T's Thumbs  10 x 2   10 x 2  2 lbs 10x2                                        HEP               OP EDUCATION:  Reviewed and pt. Will progress. Denies handout.      Goals:  Active       chronic neck pain; chronic migraine; weakness       Pt. will c/o less than 3/10 neck pain and HA pain.        Start:  06/02/25    Expected End:  08/02/25            Pt. Will be indep with progressive HEP to cont. Progress made in PT.        Start:  06/02/25    Expected End:  09/02/25            Pt. will increase posture 80% of session with less than 3 VC.        Start:  06/02/25    Expected End:  09/02/25            Pt. will increase UE DARIO strength to wnl to allow increase posture.        Start:  06/02/25    Expected End:  09/02/25

## 2025-06-17 ENCOUNTER — APPOINTMENT (OUTPATIENT)
Dept: PHYSICAL THERAPY | Facility: HOSPITAL | Age: 35
End: 2025-06-17
Payer: COMMERCIAL

## 2025-06-19 ENCOUNTER — APPOINTMENT (OUTPATIENT)
Dept: PHYSICAL THERAPY | Facility: HOSPITAL | Age: 35
End: 2025-06-19
Payer: COMMERCIAL

## 2025-06-24 ENCOUNTER — APPOINTMENT (OUTPATIENT)
Dept: PHYSICAL THERAPY | Facility: HOSPITAL | Age: 35
End: 2025-06-24
Payer: COMMERCIAL

## 2025-06-26 ENCOUNTER — APPOINTMENT (OUTPATIENT)
Dept: PHYSICAL THERAPY | Facility: HOSPITAL | Age: 35
End: 2025-06-26
Payer: COMMERCIAL

## 2025-07-10 ENCOUNTER — CONSULT (OUTPATIENT)
Facility: HOSPITAL | Age: 35
End: 2025-07-10
Payer: COMMERCIAL

## 2025-07-10 ENCOUNTER — HOSPITAL ENCOUNTER (OUTPATIENT)
Dept: RADIOLOGY | Facility: HOSPITAL | Age: 35
Discharge: HOME | End: 2025-07-10
Payer: COMMERCIAL

## 2025-07-10 VITALS — DIASTOLIC BLOOD PRESSURE: 86 MMHG | HEART RATE: 91 BPM | SYSTOLIC BLOOD PRESSURE: 127 MMHG | RESPIRATION RATE: 20 BRPM

## 2025-07-10 DIAGNOSIS — G43.709 CHRONIC MIGRAINE WITHOUT AURA WITHOUT STATUS MIGRAINOSUS, NOT INTRACTABLE: ICD-10-CM

## 2025-07-10 DIAGNOSIS — G44.86 CERVICOGENIC HEADACHE: Primary | ICD-10-CM

## 2025-07-10 DIAGNOSIS — G44.86 CERVICOGENIC HEADACHE: ICD-10-CM

## 2025-07-10 DIAGNOSIS — M79.18 CERVICAL MYOFASCIAL PAIN SYNDROME: ICD-10-CM

## 2025-07-10 PROCEDURE — 72040 X-RAY EXAM NECK SPINE 2-3 VW: CPT

## 2025-07-10 PROCEDURE — 99214 OFFICE O/P EST MOD 30 MIN: CPT | Performed by: PHYSICAL MEDICINE & REHABILITATION

## 2025-07-10 PROCEDURE — 72040 X-RAY EXAM NECK SPINE 2-3 VW: CPT | Performed by: STUDENT IN AN ORGANIZED HEALTH CARE EDUCATION/TRAINING PROGRAM

## 2025-07-10 PROCEDURE — 99204 OFFICE O/P NEW MOD 45 MIN: CPT | Performed by: PHYSICAL MEDICINE & REHABILITATION

## 2025-07-10 RX ORDER — GABAPENTIN 100 MG/1
CAPSULE ORAL
Qty: 90 CAPSULE | Refills: 1 | Status: SHIPPED | OUTPATIENT
Start: 2025-07-10

## 2025-07-10 RX ORDER — MELOXICAM 15 MG/1
15 TABLET ORAL DAILY PRN
Qty: 30 TABLET | Refills: 2 | Status: SHIPPED | OUTPATIENT
Start: 2025-07-10 | End: 2025-08-09

## 2025-07-10 ASSESSMENT — PAIN SCALES - GENERAL: PAINLEVEL_OUTOF10: 2

## 2025-07-10 NOTE — PROGRESS NOTES
Ref by DR. Pierre for neck and left shoulder arm pain.  Also daily headaches.  No resent xray.  Had physical therapy in 6-2025.  Was discharges and is currently doing exercised daily for neck arm and shoulder pain.  Has not helped the headaches.  Neck and arm pain is slightly better.

## 2025-07-17 ENCOUNTER — PATIENT MESSAGE (OUTPATIENT)
Dept: PRIMARY CARE | Facility: CLINIC | Age: 35
End: 2025-07-17
Payer: COMMERCIAL

## 2025-07-17 DIAGNOSIS — F41.9 ANXIETY: Primary | ICD-10-CM

## 2025-07-23 RX ORDER — BUSPIRONE HYDROCHLORIDE 10 MG/1
10 TABLET ORAL 3 TIMES DAILY PRN
Qty: 60 TABLET | Refills: 5 | Status: SHIPPED | OUTPATIENT
Start: 2025-07-23 | End: 2026-07-23

## 2025-07-28 ENCOUNTER — APPOINTMENT (OUTPATIENT)
Dept: INTEGRATIVE MEDICINE | Facility: CLINIC | Age: 35
End: 2025-07-28
Payer: COMMERCIAL

## 2025-07-28 DIAGNOSIS — G43.809 CERVICOGENIC MIGRAINE: ICD-10-CM

## 2025-07-28 DIAGNOSIS — M99.00 SEGMENTAL AND SOMATIC DYSFUNCTION OF HEAD REGION: ICD-10-CM

## 2025-07-28 DIAGNOSIS — M99.01 SEGMENTAL AND SOMATIC DYSFUNCTION OF CERVICAL REGION: Primary | ICD-10-CM

## 2025-07-28 DIAGNOSIS — M54.2 CERVICALGIA: ICD-10-CM

## 2025-07-28 DIAGNOSIS — M79.9 POSTURAL STRAIN: ICD-10-CM

## 2025-07-28 DIAGNOSIS — M99.02 SEGMENTAL AND SOMATIC DYSFUNCTION OF THORACIC REGION: ICD-10-CM

## 2025-07-28 PROCEDURE — 98941 CHIROPRACT MANJ 3-4 REGIONS: CPT | Performed by: CHIROPRACTOR

## 2025-07-28 PROCEDURE — 99203 OFFICE O/P NEW LOW 30 MIN: CPT | Performed by: CHIROPRACTOR

## 2025-07-31 ENCOUNTER — ALLIED HEALTH (OUTPATIENT)
Dept: INTEGRATIVE MEDICINE | Facility: CLINIC | Age: 35
End: 2025-07-31
Payer: COMMERCIAL

## 2025-07-31 DIAGNOSIS — M99.01 SEGMENTAL AND SOMATIC DYSFUNCTION OF CERVICAL REGION: Primary | ICD-10-CM

## 2025-07-31 DIAGNOSIS — M54.2 CERVICALGIA: ICD-10-CM

## 2025-07-31 DIAGNOSIS — M99.00 SEGMENTAL AND SOMATIC DYSFUNCTION OF HEAD REGION: ICD-10-CM

## 2025-07-31 DIAGNOSIS — G43.809 CERVICOGENIC MIGRAINE: ICD-10-CM

## 2025-07-31 DIAGNOSIS — M79.9 POSTURAL STRAIN: ICD-10-CM

## 2025-07-31 DIAGNOSIS — M99.02 SEGMENTAL AND SOMATIC DYSFUNCTION OF THORACIC REGION: ICD-10-CM

## 2025-07-31 PROCEDURE — 98941 CHIROPRACT MANJ 3-4 REGIONS: CPT | Performed by: CHIROPRACTOR

## 2025-07-31 NOTE — PROGRESS NOTES
"Subjective   Patient ID: Rika Vasquez is a 35 y.o. female who presents July 31, 2025 for chiropractic care.    (2/24) [facility]    Today, the patient rates their degree of pain as a {PAIN SCALE NUMBERS:76453} out of 10 on the numeric pain rating scale.     HPI     Objective   Physical Exam    Palpation of the following regions revealed:  {Palpation Regions (Optional):02487}    {Segmental Joint(s) (Optional):48035::\"Segmental joint dysfunction was assessed with motion palpation and is identified in the following areas:\"}    Assessment/Plan   {Today's Treatment Included (Optional):28430}    Soft-tissue mobilization was performed in the following areas:  {Soft tissue mobilization treatment region (Optional):59832}    Recommended follow-up in {Numbers; 1-10:00511} {days/weeks/months:6122}.     The patient tolerated today's treatment with little or no additional discomfort and was instructed to contact the office for questions or concerns.   " and tenderness.  Levator scapulae bilateral, muscular hypertonicity.  Cervical paraspinals bilateral, hypertonicity and tenderness.    Segmental Joint(s): Segmental joint dysfunction was assessed with motion palpation and is identified in the following areas:  Cervical : C1 C2 C6  Thoracic : T2., T6, T7, and T10    Assessment/Plan   Today's Treatment Included: Spinal manipulation to the following regions of segmental dysfunction identified on examination, using age-appropriate and injury specific force. Segmental Joint(s) Cervical : C1 C2 C6 Segmental Joint(s) Thoracic : T2., T4, T5, and T6   Chiropractic manipulation treatment techniques utilized: Diversified CMT  Soft tissue mobilization techniques utilized during treatment: Pin and Stretch    Soft-tissue mobilization was performed in the following areas:  Suboccipital bilateral, Cervical paraspinal mm. bilateral, Scalenes bilateral, Upper Trapezius bilateral, and Levator Scap. bilateral    Recommended follow-up in 1 week(s).     The patient tolerated today's treatment with little or no additional discomfort and was instructed to contact the office for questions or concerns.

## 2025-08-05 DIAGNOSIS — G43.709 CHRONIC MIGRAINE WITHOUT AURA WITHOUT STATUS MIGRAINOSUS, NOT INTRACTABLE: ICD-10-CM

## 2025-08-05 RX ORDER — RIZATRIPTAN BENZOATE 10 MG/1
TABLET ORAL
Qty: 12 TABLET | Refills: 0 | Status: SHIPPED | OUTPATIENT
Start: 2025-08-05 | End: 2025-08-06

## 2025-08-06 RX ORDER — RIZATRIPTAN BENZOATE 10 MG/1
10 TABLET ORAL ONCE AS NEEDED
Qty: 12 TABLET | Refills: 0 | Status: SHIPPED | OUTPATIENT
Start: 2025-08-06

## 2025-08-11 ASSESSMENT — ENCOUNTER SYMPTOMS
RESPIRATORY NEGATIVE: 1
PSYCHIATRIC NEGATIVE: 1
HEADACHES: 1
CARDIOVASCULAR NEGATIVE: 1
GASTROINTESTINAL NEGATIVE: 1
HEMATOLOGIC/LYMPHATIC NEGATIVE: 1
MYALGIAS: 1
EYES NEGATIVE: 1
NECK STIFFNESS: 1
NUMBNESS: 1
CONSTITUTIONAL NEGATIVE: 1
NECK PAIN: 1

## 2025-08-20 ENCOUNTER — APPOINTMENT (OUTPATIENT)
Dept: INTEGRATIVE MEDICINE | Facility: CLINIC | Age: 35
End: 2025-08-20
Payer: COMMERCIAL

## 2025-08-20 DIAGNOSIS — M99.00 SEGMENTAL AND SOMATIC DYSFUNCTION OF HEAD REGION: ICD-10-CM

## 2025-08-20 DIAGNOSIS — M79.9 POSTURAL STRAIN: ICD-10-CM

## 2025-08-20 DIAGNOSIS — G43.809 CERVICOGENIC MIGRAINE: ICD-10-CM

## 2025-08-20 DIAGNOSIS — M54.2 CERVICALGIA: ICD-10-CM

## 2025-08-20 DIAGNOSIS — M99.02 SEGMENTAL AND SOMATIC DYSFUNCTION OF THORACIC REGION: ICD-10-CM

## 2025-08-20 DIAGNOSIS — M99.01 SEGMENTAL AND SOMATIC DYSFUNCTION OF CERVICAL REGION: Primary | ICD-10-CM

## 2025-08-20 PROCEDURE — 98941 CHIROPRACT MANJ 3-4 REGIONS: CPT | Performed by: CHIROPRACTOR

## 2025-08-21 ENCOUNTER — OFFICE VISIT (OUTPATIENT)
Facility: HOSPITAL | Age: 35
End: 2025-08-21
Payer: COMMERCIAL

## 2025-08-21 VITALS — RESPIRATION RATE: 20 BRPM | SYSTOLIC BLOOD PRESSURE: 100 MMHG | DIASTOLIC BLOOD PRESSURE: 68 MMHG | HEART RATE: 68 BPM

## 2025-08-21 DIAGNOSIS — G43.709 CHRONIC MIGRAINE WITHOUT AURA WITHOUT STATUS MIGRAINOSUS, NOT INTRACTABLE: ICD-10-CM

## 2025-08-21 DIAGNOSIS — G44.86 CERVICOGENIC HEADACHE: ICD-10-CM

## 2025-08-21 DIAGNOSIS — M79.18 CERVICAL MYOFASCIAL PAIN SYNDROME: Primary | ICD-10-CM

## 2025-08-21 PROCEDURE — 99214 OFFICE O/P EST MOD 30 MIN: CPT | Mod: 50 | Performed by: PHYSICAL MEDICINE & REHABILITATION

## 2025-08-21 PROCEDURE — 2500000004 HC RX 250 GENERAL PHARMACY W/ HCPCS (ALT 636 FOR OP/ED): Performed by: PHYSICAL MEDICINE & REHABILITATION

## 2025-08-21 RX ORDER — GABAPENTIN 100 MG/1
CAPSULE ORAL
Qty: 180 CAPSULE | Refills: 1 | Status: SHIPPED | OUTPATIENT
Start: 2025-08-21

## 2025-08-21 RX ORDER — MELOXICAM 15 MG/1
15 TABLET ORAL DAILY PRN
Qty: 30 TABLET | Refills: 2 | Status: SHIPPED | OUTPATIENT
Start: 2025-08-21

## 2025-08-21 RX ORDER — MELOXICAM 15 MG/1
15 TABLET ORAL DAILY PRN
COMMUNITY
Start: 2025-08-03 | End: 2025-08-21 | Stop reason: SDUPTHER

## 2025-08-21 RX ORDER — LIDOCAINE HYDROCHLORIDE 10 MG/ML
5 INJECTION, SOLUTION INFILTRATION; PERINEURAL ONCE
Status: COMPLETED | OUTPATIENT
Start: 2025-08-21 | End: 2025-08-21

## 2025-08-21 RX ADMIN — LIDOCAINE HYDROCHLORIDE 5 ML: 10 INJECTION, SOLUTION INFILTRATION; PERINEURAL at 17:48

## 2025-08-21 ASSESSMENT — PAIN SCALES - GENERAL: PAINLEVEL_OUTOF10: 3

## 2025-09-15 ENCOUNTER — APPOINTMENT (OUTPATIENT)
Dept: INTEGRATIVE MEDICINE | Facility: CLINIC | Age: 35
End: 2025-09-15
Payer: COMMERCIAL

## 2025-10-03 ENCOUNTER — APPOINTMENT (OUTPATIENT)
Dept: PRIMARY CARE | Facility: CLINIC | Age: 35
End: 2025-10-03
Payer: COMMERCIAL

## 2025-11-13 ENCOUNTER — APPOINTMENT (OUTPATIENT)
Dept: NEUROLOGY | Facility: CLINIC | Age: 35
End: 2025-11-13
Payer: COMMERCIAL

## 2026-02-16 ENCOUNTER — APPOINTMENT (OUTPATIENT)
Dept: OBSTETRICS AND GYNECOLOGY | Facility: CLINIC | Age: 36
End: 2026-02-16
Payer: COMMERCIAL